# Patient Record
Sex: FEMALE | Race: OTHER | Employment: FULL TIME | URBAN - METROPOLITAN AREA
[De-identification: names, ages, dates, MRNs, and addresses within clinical notes are randomized per-mention and may not be internally consistent; named-entity substitution may affect disease eponyms.]

---

## 2019-02-15 ENCOUNTER — HOSPITAL ENCOUNTER (OUTPATIENT)
Dept: CT IMAGING | Age: 49
Discharge: HOME | End: 2019-02-15
Attending: NURSE PRACTITIONER
Payer: COMMERCIAL

## 2019-02-15 DIAGNOSIS — R10.9 LEFT FLANK PAIN: ICD-10-CM

## 2019-02-15 PROCEDURE — 74176 CT ABD & PELVIS W/O CONTRAST: CPT

## 2019-02-20 PROBLEM — Z85.43 HISTORY OF OVARIAN CANCER: Status: ACTIVE | Noted: 2019-02-20

## 2019-02-20 PROBLEM — J30.1 SEASONAL ALLERGIC RHINITIS DUE TO POLLEN: Status: ACTIVE | Noted: 2019-02-20

## 2019-02-20 PROBLEM — F41.9 ANXIETY: Status: ACTIVE | Noted: 2019-02-20

## 2019-02-20 PROBLEM — E78.2 MIXED HYPERLIPIDEMIA: Status: ACTIVE | Noted: 2019-02-20

## 2019-02-20 PROBLEM — G43.C1 INTRACTABLE PERIODIC HEADACHE SYNDROME: Status: ACTIVE | Noted: 2019-02-20

## 2019-02-20 PROBLEM — C56.1 MALIGNANT NEOPLASM OF RIGHT OVARY (HCC): Status: ACTIVE | Noted: 2019-02-20

## 2019-03-25 ENCOUNTER — HOSPITAL ENCOUNTER (OUTPATIENT)
Dept: MAMMOGRAPHY | Age: 49
Discharge: HOME | End: 2019-03-25
Attending: INTERNAL MEDICINE
Payer: COMMERCIAL

## 2019-03-25 DIAGNOSIS — Z12.31 VISIT FOR SCREENING MAMMOGRAM: ICD-10-CM

## 2019-03-25 PROCEDURE — 77067 SCR MAMMO BI INCL CAD: CPT

## 2019-04-03 NOTE — PROGRESS NOTES
LM on VM informing pt of Mammogram result, further imaging needed and she will be notified by Radiology to schedule this.

## 2019-04-03 NOTE — PROGRESS NOTES
Pt called back and I reiterated to her Mammogram result, further imaging needed and she will be notified by Radiology to schedule this.

## 2019-04-08 ENCOUNTER — HOSPITAL ENCOUNTER (OUTPATIENT)
Dept: SURGERY | Age: 49
Discharge: HOME | End: 2019-04-08

## 2019-04-10 VITALS — BODY MASS INDEX: 26.68 KG/M2 | WEIGHT: 166 LBS | HEIGHT: 66 IN

## 2019-04-10 NOTE — PERIOP NOTES
Patient verified name, , and procedure. Type: 1a; abbreviated assessment per anesthesia guidelines  Labs per surgeon: None. Labs per anesthesia: None. Instructed pt that they will be notified via office/GI LAB for time of arrival to GI lab. Follow diet and prep instructions per office including NPO status. If patient has NOT received instructions from office patient is advised to call surgeon office, verbalizes understanding. Bath or shower the night before and the am of surgery with non-mositurizing soap. No lotions, oils, powders, cologne on skin. No make up, eye make up or jewelry. Wear loose fitting comfortable, clean clothing. Must have adult present in building the entire time . Medications for the day of procedure none, patient to hold all vitamins/supplements/herbals starting now. The following discharge instructions reviewed with patient: medication given during procedure may cause drowsiness for several hours, therefore, do not drive or operate machinery for remainder of the day. You may not drink alcohol on the day of your procedure, please resume regular diet and activity unless otherwise directed. You may experience abdominal distention for several hours that is relieved by the passage of gas. Contact your physician if you have any of the following: fever or chills, severe abdominal pain or excessive amount of bleeding or a large amount when having a bowel movement.  Occasional specks of blood with bowel movement would not be unusual.

## 2019-04-11 ENCOUNTER — HOSPITAL ENCOUNTER (OUTPATIENT)
Dept: MAMMOGRAPHY | Age: 49
Discharge: HOME | End: 2019-04-11
Attending: INTERNAL MEDICINE
Payer: COMMERCIAL

## 2019-04-11 ENCOUNTER — HOSPITAL ENCOUNTER (OUTPATIENT)
Dept: LAB | Age: 49
Discharge: HOME | End: 2019-04-11
Payer: COMMERCIAL

## 2019-04-11 DIAGNOSIS — N63.20 MASS OF LEFT BREAST ON MAMMOGRAM: ICD-10-CM

## 2019-04-11 DIAGNOSIS — Z85.43 HISTORY OF OVARIAN CANCER: ICD-10-CM

## 2019-04-11 LAB
Lab: NORMAL
REFERENCE LAB,REFLB: NORMAL
TEST DESCRIPTION:,ATST: NORMAL

## 2019-04-11 PROCEDURE — 36415 COLL VENOUS BLD VENIPUNCTURE: CPT

## 2019-04-11 PROCEDURE — 77065 DX MAMMO INCL CAD UNI: CPT

## 2019-04-11 PROCEDURE — 76642 ULTRASOUND BREAST LIMITED: CPT

## 2019-04-14 ENCOUNTER — ANESTHESIA EVENT (OUTPATIENT)
Dept: ENDOSCOPY | Age: 49
End: 2019-04-14
Payer: COMMERCIAL

## 2019-04-15 ENCOUNTER — ANESTHESIA (OUTPATIENT)
Dept: ENDOSCOPY | Age: 49
End: 2019-04-15
Payer: COMMERCIAL

## 2019-04-15 ENCOUNTER — HOSPITAL ENCOUNTER (OUTPATIENT)
Age: 49
Setting detail: OUTPATIENT SURGERY
Discharge: HOME | End: 2019-04-15
Attending: SURGERY | Admitting: SURGERY
Payer: COMMERCIAL

## 2019-04-15 VITALS
DIASTOLIC BLOOD PRESSURE: 75 MMHG | RESPIRATION RATE: 14 BRPM | OXYGEN SATURATION: 98 % | SYSTOLIC BLOOD PRESSURE: 124 MMHG | TEMPERATURE: 98.6 F | HEART RATE: 67 BPM | WEIGHT: 165 LBS | BODY MASS INDEX: 26.63 KG/M2

## 2019-04-15 DIAGNOSIS — Z12.11 COLON CANCER SCREENING: ICD-10-CM

## 2019-04-15 LAB — HCG UR QL: NEGATIVE

## 2019-04-15 PROCEDURE — 81025 URINE PREGNANCY TEST: CPT

## 2019-04-15 PROCEDURE — 45378 DIAGNOSTIC COLONOSCOPY: CPT | Performed by: SURGERY

## 2019-04-15 PROCEDURE — 74011250636 HC RX REV CODE- 250/636: Performed by: ANESTHESIOLOGY

## 2019-04-15 PROCEDURE — 74011250636 HC RX REV CODE- 250/636

## 2019-04-15 PROCEDURE — 76040000025: Performed by: SURGERY

## 2019-04-15 PROCEDURE — 76060000032 HC ANESTHESIA 0.5 TO 1 HR: Performed by: SURGERY

## 2019-04-15 RX ORDER — SODIUM CHLORIDE, SODIUM LACTATE, POTASSIUM CHLORIDE, CALCIUM CHLORIDE 600; 310; 30; 20 MG/100ML; MG/100ML; MG/100ML; MG/100ML
100 INJECTION, SOLUTION INTRAVENOUS CONTINUOUS
Status: DISCONTINUED | OUTPATIENT
Start: 2019-04-15 | End: 2019-04-15 | Stop reason: HOSPADM

## 2019-04-15 RX ORDER — SODIUM CHLORIDE 0.9 % (FLUSH) 0.9 %
5-40 SYRINGE (ML) INJECTION EVERY 8 HOURS
Status: DISCONTINUED | OUTPATIENT
Start: 2019-04-15 | End: 2019-04-15 | Stop reason: HOSPADM

## 2019-04-15 RX ORDER — SODIUM CHLORIDE 0.9 % (FLUSH) 0.9 %
5-40 SYRINGE (ML) INJECTION AS NEEDED
Status: DISCONTINUED | OUTPATIENT
Start: 2019-04-15 | End: 2019-04-15 | Stop reason: HOSPADM

## 2019-04-15 RX ORDER — PROPOFOL 10 MG/ML
INJECTION, EMULSION INTRAVENOUS AS NEEDED
Status: DISCONTINUED | OUTPATIENT
Start: 2019-04-15 | End: 2019-04-15 | Stop reason: HOSPADM

## 2019-04-15 RX ORDER — PROPOFOL 10 MG/ML
INJECTION, EMULSION INTRAVENOUS
Status: DISCONTINUED | OUTPATIENT
Start: 2019-04-15 | End: 2019-04-15 | Stop reason: HOSPADM

## 2019-04-15 RX ADMIN — SODIUM CHLORIDE, SODIUM LACTATE, POTASSIUM CHLORIDE, AND CALCIUM CHLORIDE 100 ML/HR: 600; 310; 30; 20 INJECTION, SOLUTION INTRAVENOUS at 08:16

## 2019-04-15 RX ADMIN — PROPOFOL 120 MCG/KG/MIN: 10 INJECTION, EMULSION INTRAVENOUS at 09:20

## 2019-04-15 RX ADMIN — PROPOFOL 50 MG: 10 INJECTION, EMULSION INTRAVENOUS at 09:30

## 2019-04-15 RX ADMIN — PROPOFOL 150 MG: 10 INJECTION, EMULSION INTRAVENOUS at 09:20

## 2019-04-15 NOTE — OP NOTES
Colonoscopy Procedure Note    Date of procedure: 4/15/2019      Name: Loyd Thompson      MRN: [id number]       : 1970       Age: 46 y.o. Sex: female    Preoperative Diagnosis: 1. CRC screening    Postoperative Diagnosis: 1. Same      2. Internal hemorrhoids    Procedure in Detail:  Informed consent was obtained for the procedure. The patient was placed in the left lateral decubitus position and sedation was induced by anesthesia. The [id number] was inserted into the rectum and advanced under direct vision to the cecum, which was identified by the ileocecal valve and appendiceal orifice. The quality of the colonic preparation was satisfactory. A careful inspection was made as the colonoscope was withdrawn, including a retroflexed view of the rectum; findings and interventions are described below. Appropriate photodocumentation was obtained. Findings:   Rectum:     - Protruding lesions:     -Internal Hemorrhoids  Sigmoid:   Normal  Descending Colon:   Normal  Transverse Colon:   Normal  Ascending Colon:   Normal  Cecum:   Normal          Specimens: No specimens were collected. Complications: None; patient tolerated the procedure well. EBL - minimal    Recommendations:   - For colon cancer screening in this average-risk patient, colonoscopy may be repeated in 10 years.      Signed By: Lon Chen MD  [facility] - Bariatric & Minimally Invasive Surgery  4/15/2019 9:45 AM

## 2019-04-15 NOTE — DISCHARGE INSTRUCTIONS
Gastrointestinal Colonoscopy/Flexible Sigmoidoscopy - Lower Exam Discharge Instructions  1. Call Dr. Mirela Ferrer at office for any problems or questions. 2. Contact the doctors office for follow up appointment as directed  3. Medication may cause drowsiness for several hours, therefore:  · Do not drive or operate machinery for reminder of the day. · No alcohol today. · Do not make any important or legal decisions for 24 hours. · Do not sign any legal documents for 24 hours. 4. Ordinarily, you may resume regular diet and activity after exam unless otherwise specified by your physician. 5. Because of air put into your colon during exam, you may experience some abdominal distension, relieved by the passage of gas, for several hours. 6. Contact your physician if you have any of the following:  · Excessive amount of bleeding - large amount when having a bowel movement. Occasional specks of blood with bowel movement would not be unusual.  · Severe abdominal pain  · Fever or Chills  7. Polyp Removal - follow these additional instructions  · Clear liquid diet for the next meal (jell-o, broth, clear drinks)  · Soft diet for 24 hours, then resume regular diet   · Take Metamucil - 1 tablespoon in juice every morning for 3 days  · No Aspirin, Advil, Aleve, Nuprin, Ibuprofen, or medications that contain these drugs for 2 weeks. Any additional instructions:  ***      Instructions given to Jennifer Lawson and other family members.   Instructions given by:  Verito Jimenez RN

## 2019-04-15 NOTE — ANESTHESIA PREPROCEDURE EVALUATION
Relevant Problems No relevant active problems Anesthetic History No history of anesthetic complications Review of Systems / Medical History Patient summary reviewed and pertinent labs reviewed Pulmonary Within defined limits Neuro/Psych Within defined limits Cardiovascular Within defined limits Exercise tolerance: >4 METS 
  
GI/Hepatic/Renal 
Within defined limits Endo/Other Within defined limits Other Findings Physical Exam 
 
Airway Mallampati: II 
TM Distance: 4 - 6 cm Neck ROM: normal range of motion Mouth opening: Normal 
 
 Cardiovascular Rhythm: regular Rate: normal 
 
 
 
 Dental 
No notable dental hx Pulmonary Breath sounds clear to auscultation Abdominal 
 
 
 
 Other Findings Anesthetic Plan ASA: 1 Anesthesia type: total IV anesthesia Anesthetic plan and risks discussed with: Patient and Spouse

## 2019-04-15 NOTE — ANESTHESIA POSTPROCEDURE EVALUATION
Procedure(s): 
COLONOSCOPY/ 28. 
 
total IV anesthesia Anesthesia Post Evaluation Multimodal analgesia: multimodal analgesia used between 6 hours prior to anesthesia start to PACU discharge Patient location during evaluation: PACU Patient participation: complete - patient participated Level of consciousness: awake Pain management: adequate Airway patency: patent Anesthetic complications: no 
Cardiovascular status: acceptable and hemodynamically stable Respiratory status: acceptable Hydration status: acceptable Comments: Acceptable for discharge from PACU. Post anesthesia nausea and vomiting:  none Vitals Value Taken Time /74 4/15/2019  9:55 AM  
Temp 37 °C (98.6 °F) 4/15/2019  9:50 AM  
Pulse 77 4/15/2019  9:55 AM  
Resp 14 4/15/2019  9:55 AM  
SpO2 99 % 4/15/2019  9:55 AM

## 2019-04-15 NOTE — H&P
Mariluz Colón MD   Bariatric & Advanced Laparoscopic Surgery & Endoscopy  [address] [place], [place] [address]  Phone [phone]   Fax [phone]      Date of visit: 4/15/2019      Primary/Requesting provider: Diana Wayne MD         Name: Jennifer Lawson      MRN: [id number]       : 1970       Age: 46 y.o. Sex: female        PCP: Diana Wayne MD     CC:  No chief complaint on file. HPI:     Jennifer Lawson is a 46 y.o. female here for a colonoscopy. PMH:    Past Medical History:   Diagnosis Date    Anxiety     Burning with urination     Headache     History of mammogram 2018    History of Papanicolaou smear of cervix     Hx of seasonal allergies     Ovarian cancer ([address]     R oophorectomy       PSH:    Past Surgical History:   Procedure Laterality Date    HX APPENDECTOMY      HX BREAST BIOPSY      HX OOPHORECTOMY Right     HX OVARIAN CYST REMOVAL Left ,        MEDS:    Current Facility-Administered Medications   Medication    lactated Ringers infusion       ALLERGIES:      Allergies   Allergen Reactions    Latex, Natural Rubber Anaphylaxis    Penicillins Swelling     Was given it in  during surgery and told by the medical staff that she is allergic. She was unsure of the reaction she had to the drug.  Papaya Rash       SH:    Social History     Tobacco Use    Smoking status: Never Smoker    Smokeless tobacco: Never Used   Substance Use Topics    Alcohol use: Yes     Comment: Social    Drug use: No       FH:    Family History   Problem Relation Age of Onset   Titus Arthritis-osteo Mother     Diabetes Father     Heart Disease Father     Stroke Paternal Uncle     Diabetes Paternal Uncle     Stomach Cancer Paternal Uncle     Breast Cancer Neg Hx        Review of systems:  Review of Systems   Constitutional: Negative.     Respiratory: Negative for hemoptysis and shortness of breath. Cardiovascular: Negative for chest pain, palpitations and leg swelling. Gastrointestinal: Negative for abdominal pain, nausea and vomiting. Physical Exam:     Visit Vitals  /74   Pulse 72   Temp 97.7 °F (36.5 °C)   Resp 14   Wt 165 lb (74.8 kg)   SpO2 98%   BMI 26.63 kg/m²       General:  Well-developed, well-nourished, no distress. Psych:  Cooperative, good insight and judgement. Neuro:  Alert, oriented to person, place and time. HEENT:  Normocephalic, atraumatic. Sclera clear. Lungs:  Unlabored breathing. Symmetrical chest expansion. Chest wall:  No tenderness or deformity. Heart:  Regular rate and rhythm. No JVD. Abdomen:  Soft, non-tender, non-distended. No palpable masses. Extremities:  Extremities normal, atraumatic, no cyanosis or edema. Skin:  Skin color, texture, turgor normal. No rashes. Assessment:  Nicolas Jolly is a 46 y.o. female was referred here for a colonoscopy. Recommendations:     1. Proceed with colonoscopy. The risks, benefits, alternatives, and consequences were reviewed with the patient, who expressed verbal understanding and agreement to proceed.        Signed: Heidi Alicea MD  Bariatric & Minimally Invasive Surgery  [facility]  4/15/2019 8:09 AM

## 2019-06-25 ENCOUNTER — HOSPITAL ENCOUNTER (OUTPATIENT)
Dept: GENERAL RADIOLOGY | Age: 49
Discharge: HOME | End: 2019-06-25
Attending: NURSE PRACTITIONER
Payer: COMMERCIAL

## 2019-06-25 DIAGNOSIS — M75.41 IMPINGEMENT SYNDROME OF RIGHT SHOULDER: ICD-10-CM

## 2019-06-25 PROCEDURE — 73030 X-RAY EXAM OF SHOULDER: CPT

## 2019-06-25 NOTE — PROGRESS NOTES
Please let Clari Wagoner know that XR is normal. She needs to try PT for 4-6 weeks and follow up in office for evaluation. Should she continue to have symptoms we can then try further imaging. May continue tylenol/advil, heat therapy prn pain.

## 2019-06-25 NOTE — PROGRESS NOTES
This has been fully explained to the patient, who indicates understanding that per Keke NP noted XR is normal. She needs to try PT for 4-6 weeks and follow up in office for evaluation. Should she continue to have symptoms we can then try further imaging. May continue tylenol/advil, heat therapy prn pain.

## 2019-06-28 ENCOUNTER — HOSPITAL ENCOUNTER (OUTPATIENT)
Dept: PHYSICAL THERAPY | Age: 49
Discharge: HOME | End: 2019-06-28
Attending: NURSE PRACTITIONER
Payer: COMMERCIAL

## 2019-06-28 DIAGNOSIS — S46.011A STRAIN OF MUSC/TEND THE ROTATOR CUFF OF RIGHT SHOULDER, INIT: ICD-10-CM

## 2019-06-28 PROCEDURE — 97110 THERAPEUTIC EXERCISES: CPT

## 2019-06-28 PROCEDURE — 97162 PT EVAL MOD COMPLEX 30 MIN: CPT

## 2019-06-28 NOTE — PROGRESS NOTES
Linette Cortez  : 1970  Primary: Xavier Dela Cruz Rpn  Secondary:  [address] at [place] [address] [facility], [address] [place] [address],[address], [place] [address].  [phone]   [phone]        OUTPATIENT PHYSICAL THERAPY: Daily Treatment Note 2019  Visit Count:  1    ICD-10: Treatment Diagnosis:   · Pain in right shoulder (M25.511)  · Stiffness of right shoulder, not elsewhere classified (M25.611)  Precautions/Allergies:   Latex, natural rubber; Penicillins; and Papaya   TREATMENT PLAN:  Effective Dates: 2019 TO 2019 (90 days). Frequency/Duration: 2 times a week for 90 Day(s)    Pre-treatment Symptoms/Complaints:  2019: Patient reports she is hurting in her shoulder today. Pain: Initial: Pain Intensity 1: 4  Pain Location 1: Shoulder  Pain Orientation 1: Right  Pain Intervention(s) 1: Rest, Medication (see MAR)  Post Session:  4/10   Medications Last Reviewed:  2019  Updated Objective Findings:  See evaluation note from today  TREATMENT:     THERAPEUTIC EXERCISE: (15 minutes):  Exercises per grid below to improve mobility and strength. Required minimal visual, verbal and manual cues to promote proper body alignment, promote proper body posture and promote proper body mechanics. Progressed resistance, range, repetitions and complexity of movement as indicated. Date:  2019   Activity/Exercise Parameters   Pulleys  Flexion/scaption to tolerance  HEP   Shoulder circles in standing 10 reps  HEP      MODALITIES: (10 minutes):      *  Cold Pack Therapy in order to provide analgesia and relieve muscle spasm. Treatment/Session Summary:    · Response to Treatment:  Patient tolerated assessment with minimal complaints of increased right shoulder pain. Patient verbalized and demonstrated understanding of HEP.   · Communication/Consultation:  None today  · Equipment provided today:  None today  · Recommendations/Intent for next treatment session: Next visit will focus on improving overall mobility and pain.     Total Treatment Billable Duration:  15 minutes + evaluation + cold pack  PT Patient Time In/Time Out  Time In: 0900  Time Out: 1221 Alex Cabrera PT    Future Appointments   Date Time Provider Yvon Tyler   6/28/2019  9:00 AM Nathan Villaseñor PT [facility]

## 2019-07-02 ENCOUNTER — HOSPITAL ENCOUNTER (OUTPATIENT)
Dept: PHYSICAL THERAPY | Age: 49
Discharge: HOME | End: 2019-07-02
Attending: NURSE PRACTITIONER
Payer: COMMERCIAL

## 2019-07-02 PROCEDURE — 97035 APP MDLTY 1+ULTRASOUND EA 15: CPT

## 2019-07-02 PROCEDURE — 97140 MANUAL THERAPY 1/> REGIONS: CPT

## 2019-07-02 PROCEDURE — 97110 THERAPEUTIC EXERCISES: CPT

## 2019-07-02 NOTE — PROGRESS NOTES
Anais Ben  : 1970  Primary: Satinder Dela Cruz Rpn  Secondary:  [address] at [place] [address] [facility], [address] [place] [address],[address], [place] [address].  [phone]   [phone]        OUTPATIENT PHYSICAL THERAPY: Daily Treatment Note 2019  Visit Count:  2    ICD-10: Treatment Diagnosis:   · Pain in right shoulder (M25.511)  · Stiffness of right shoulder, not elsewhere classified (M25.611)  Precautions/Allergies:   Latex, natural rubber; Penicillins; and Papaya   TREATMENT PLAN:  Effective Dates: 2019 TO 2019 (90 days). Frequency/Duration: 2 times a week for 90 Day(s)    Pre-treatment Symptoms/Complaints:  2019: Patient reports she is hurting in her shoulder today. Pain: Initial:   7 Post Session:  5/10   Medications Last Reviewed:  2019  Updated Objective Findings:  None Today  TREATMENT:     THERAPEUTIC EXERCISE: (20 minutes):  Exercises per grid below to improve mobility and strength. Required minimal visual, verbal and manual cues to promote proper body alignment, promote proper body posture and promote proper body mechanics. Progressed resistance, range, repetitions and complexity of movement as indicated. MANUAL THERAPY: (20 minutes): Joint mobilization was utilized and necessary because of the patient's restricted joint motion, loss of articular motion and restricted motion of soft tissue. Date:  19   Activity/Exercise Parameters   UBE 5 min Level 1.5   Tband IR ER Retration Extension Red x 10 reps   Wall Ladder climb R x 5 reps           Pulleys  Flexion/scaption to tolerance  HEP   Shoulder circles in standing 10 reps  HEP      MODALITIES: (8 minutes): *  Ultrasound was used today secondary to the patient having tightened structures limiting joint motion that require an increase in extensibility and pain.  Ultrasound was used today to reduce pain, reduce joint stiffness, increase muscle flexibility, increase tendon flexibility and increase ligament flexibility. Treatment/Session Summary:    · Response to Treatment:  Patient tolerated assessment with minimal complaints of increased right shoulder pain. Patient verbalized and demonstrated understanding of HEP. · Communication/Consultation:  None today  · Equipment provided today:  None today  · Recommendations/Intent for next treatment session: Next visit will focus on improving overall mobility and pain.     Total Treatment Billable Duration:  48  PT Patient Time In/Time Out  Time In: 1100  Time Out: [address] [place], [place]    Future Appointments   Date Time Provider Yvon Moseleyi   7/8/2019  1:45 PM Estevan Montes PTA [facility]   7/12/2019 10:15 AM Dayne Jamison, PT [facility]

## 2019-07-08 ENCOUNTER — APPOINTMENT (OUTPATIENT)
Dept: PHYSICAL THERAPY | Age: 49
End: 2019-07-08
Attending: NURSE PRACTITIONER
Payer: COMMERCIAL

## 2019-07-09 ENCOUNTER — HOSPITAL ENCOUNTER (OUTPATIENT)
Dept: PHYSICAL THERAPY | Age: 49
Discharge: HOME | End: 2019-07-09
Attending: NURSE PRACTITIONER
Payer: COMMERCIAL

## 2019-07-09 PROCEDURE — 97140 MANUAL THERAPY 1/> REGIONS: CPT

## 2019-07-09 PROCEDURE — 97110 THERAPEUTIC EXERCISES: CPT

## 2019-07-09 NOTE — PROGRESS NOTES
Brijesh Ramos  : 1970  Primary: Su Dela Cruz Rpn  Secondary:  [address] at [place] [address] [facility], [address] [place] [address],[address], [facility] [address].  [phone]   [phone]        OUTPATIENT PHYSICAL THERAPY: Daily Treatment Note 2019  Visit Count:  3    ICD-10: Treatment Diagnosis:   · Pain in right shoulder (M25.511)  · Stiffness of right shoulder, not elsewhere classified (M25.611)  Precautions/Allergies:   Latex, natural rubber; Penicillins; and Papaya   TREATMENT PLAN:  Effective Dates: 2019 TO 2019 (90 days). Frequency/Duration: 2 times a week for 90 Day(s)    Pre-treatment Symptoms/Complaints:  2019: Patient reports she is hurting today. Pain: Initial: Pain Intensity 1: 4  Pain Location 1: Shoulder  Pain Orientation 1: Right  Pain Intervention(s) 1: Rest, Medication (see MAR)  Post Session:  4/10   Medications Last Reviewed:  2019  Updated Objective Findings:  None Today  TREATMENT:     THERAPEUTIC EXERCISE: (20 minutes):  Exercises per grid below to improve mobility and strength. Required minimal visual, verbal and manual cues to promote proper body alignment, promote proper body posture and promote proper body mechanics. Progressed resistance, range, repetitions and complexity of movement as indicated. Date:  2019   Activity/Exercise Parameters   UBE 5 minutes  Level 1.5   Theraband Internal Rotation  Red t-band  10 reps   Theraband External rotation Red t-band  10 reps   Theraband Rows Red t-band  10 reps   Theraband pulls Red t-band  10 reps   Wall Ladder climb R UE  5 reps   Pulleys  Flexion/scaption to tolerance  HEP   Shoulder circles in standing 10 reps  HEP     MANUAL THERAPY: (15 minutes): Joint and soft tissue mobilization to right upper trap, scapular, and deltoid region was utilized and necessary because of the patient's restricted joint motion, painful spasm, loss of articular motion and restricted motion of soft tissue. MODALITIES: (10 minutes):      *  Cold Pack Therapy in order to provide analgesia, relieve muscle spasm and reduce inflammation and edema. Treatment/Session Summary:    · Response to Treatment:  Patient completed all activities with pain during all activities. · Communication/Consultation:  None today  · Equipment provided today:  None today  · Recommendations/Intent for next treatment session: Next visit will focus on improving overall mobility and pain.     Total Treatment Billable Duration:  35 minutes  PT Patient Time In/Time Out  Time In: 1100  Time Out: [address], PT    Future Appointments   Date Time Provider Yvon Tyler   7/9/2019 11:00 AM Jodie Morris, PT [facility]Z   7/12/2019 10:15 AM [facility]vis Zepeda, [facility]

## 2019-07-12 ENCOUNTER — HOSPITAL ENCOUNTER (OUTPATIENT)
Dept: PHYSICAL THERAPY | Age: 49
Discharge: HOME | End: 2019-07-12
Attending: NURSE PRACTITIONER
Payer: COMMERCIAL

## 2019-07-12 PROCEDURE — 97140 MANUAL THERAPY 1/> REGIONS: CPT

## 2019-07-12 PROCEDURE — 97110 THERAPEUTIC EXERCISES: CPT

## 2019-07-12 NOTE — PROGRESS NOTES
Anais Ben  : 1970  Primary: Satinder Dela Cruz Rpn  Secondary:  [address] at [place] [address] [facility], [address] [place] [address],[address], [place] [address].  [phone]   [phone]        OUTPATIENT PHYSICAL THERAPY: Daily Treatment Note 2019  Visit Count:  4    ICD-10: Treatment Diagnosis:   · Pain in right shoulder (M25.511)  · Stiffness of right shoulder, not elsewhere classified (M25.611)  Precautions/Allergies:   Latex, natural rubber; Penicillins; and Papaya   TREATMENT PLAN:  Effective Dates: 2019 TO 2019 (90 days). Frequency/Duration: 2 times a week for 90 Day(s)    Pre-treatment Symptoms/Complaints:  2019: Patient reports she felt good for two days after her last session and even slept well that night, but she reached around in her car to get something with her right arm and so now she is hurting. Pain: Initial: Pain Intensity 1: 4  Pain Location 1: Shoulder  Pain Orientation 1: Right  Pain Intervention(s) 1: Rest, Medication (see MAR)  Post Session:  4/10   Medications Last Reviewed:  2019  Updated Objective Findings:  None Today  TREATMENT:     THERAPEUTIC EXERCISE: (20 minutes):  Exercises per grid below to improve mobility and strength. Required minimal visual, verbal and manual cues to promote proper body alignment, promote proper body posture and promote proper body mechanics. Progressed resistance, range, repetitions and complexity of movement as indicated.    Date:  2019   Activity/Exercise Parameters   UBE 5 minutes  Level 1.5   Theraband Internal Rotation  Red t-band  10 reps   Theraband External rotation Red t-band  10 reps   Theraband Rows Red t-band  10 reps   Theraband pulls Red t-band  10 reps   Wall Ladder climb R UE  5 reps   Pulleys  Flexion/scaption to tolerance  HEP   Shoulder circles in standing 10 reps  HEP     MANUAL THERAPY: (15 minutes): Joint and soft tissue mobilization to right upper trap, scapular, and deltoid region was utilized and necessary because of the patient's restricted joint motion, painful spasm, loss of articular motion and restricted motion of soft tissue. MODALITIES: (10 minutes):      *  Cold Pack Therapy in order to provide analgesia, relieve muscle spasm and reduce inflammation and edema. Treatment/Session Summary:    · Response to Treatment:  Patient completed all activities with minimal decrease in tightness compared to last visit. Improved with manual therapy to scapula region. · Communication/Consultation:  None today  · Equipment provided today:  None today  · Recommendations/Intent for next treatment session: Next visit will focus on improving overall mobility and pain.     Total Treatment Billable Duration:  35 minutes  PT Patient Time In/Time Out  Time In: 1015  Time Out: [address], PT    Future Appointments   Date Time Provider Yvon Tyler   7/12/2019 10:15 AM ROBYN Cordero

## 2019-07-16 ENCOUNTER — HOSPITAL ENCOUNTER (OUTPATIENT)
Dept: PHYSICAL THERAPY | Age: 49
Discharge: HOME | End: 2019-07-16
Attending: NURSE PRACTITIONER
Payer: COMMERCIAL

## 2019-07-16 PROCEDURE — 97140 MANUAL THERAPY 1/> REGIONS: CPT

## 2019-07-16 PROCEDURE — 97110 THERAPEUTIC EXERCISES: CPT

## 2019-07-16 NOTE — PROGRESS NOTES
Davonte Alvarez  : 1970  Primary: Tello Dela Cruz Rpn  Secondary:  [address] at [place] [id number] [facility], [address] [place] [address],[address], [facility] [address].  [phone]   [phone]        OUTPATIENT PHYSICAL THERAPY: Daily Treatment Note 2019  Visit Count:  5    ICD-10: Treatment Diagnosis:   · Pain in right shoulder (M25.511)  · Stiffness of right shoulder, not elsewhere classified (M25.611)  Precautions/Allergies:   Latex, natural rubber; Penicillins; and Papaya   TREATMENT PLAN:  Effective Dates: 2019 TO 2019 (90 days). Frequency/Duration: 2 times a week for 90 Day(s)    Pre-treatment Symptoms/Complaints:  2019:  \"I am hurting more today\"   Pain: Initial:    6-10 Post Session:  4/10   Medications Last Reviewed:  2019  Updated Objective Findings:  None Today  TREATMENT:     THERAPEUTIC EXERCISE: (20 minutes):  Exercises per grid below to improve mobility and strength. Required minimal visual, verbal and manual cues to promote proper body alignment, promote proper body posture and promote proper body mechanics. Progressed resistance, range, repetitions and complexity of movement as indicated. Date:  2019   Activity/Exercise Parameters   UBE 5 minutes  Level 1.5   Theraband Internal Rotation  Red t-band  10 reps   Theraband External rotation Red t-band  10 reps   Theraband Rows Red t-band  10 reps   Theraband pulls Red t-band  10 reps   Wall Ladder climb R UE  5 reps   Pulleys  Flexion/scaption to tolerance  HEP   Shoulder circles in standing 10 reps  HEP     MANUAL THERAPY: (25 minutes): soft tissue through upper trap and rom in all planes of motion-increased tightness today. was utilized and necessary because of the patient's restricted joint motion, painful spasm, loss of articular motion and restricted motion of soft tissue.        MODALITIES: (icing at home today minutes):      *  Cold Pack Therapy in order to provide analgesia, relieve muscle spasm and reduce inflammation and edema. Treatment/Session Summary:    · Response to Treatment:   Patient has increased ROM in shoulder today with pain. Patient going to get referred to Ortho MD for further evaluation on shoulder. Continue plan of care. .  · Communication/Consultation:  None today  · Equipment provided today:  None today  · Recommendations/Intent for next treatment session: Next visit will focus on improving overall mobility and pain.     Total Treatment Billable Duration:  45 minutes  PT Patient Time In/Time Out  Time In: 1262  Time Out: [address], PTA    Future Appointments   Date Time Provider Yvon Tyler   7/19/2019  4:45 PM Coretta Zayas, PT [facility]   7/25/2019  4:00 PM Talia Jamison, [facility]

## 2019-07-19 ENCOUNTER — HOSPITAL ENCOUNTER (OUTPATIENT)
Dept: PHYSICAL THERAPY | Age: 49
Discharge: HOME | End: 2019-07-19
Attending: NURSE PRACTITIONER
Payer: COMMERCIAL

## 2019-07-19 PROCEDURE — 97110 THERAPEUTIC EXERCISES: CPT

## 2019-07-19 PROCEDURE — 97140 MANUAL THERAPY 1/> REGIONS: CPT

## 2019-07-19 NOTE — PROGRESS NOTES
Michael Hernandez  : 1970  Primary: Roxanne Dela Cruz Rpn  Secondary:  [address]  at [place] [address] [facility], [address] [place] [address],[address], [address] [facility]  [phone]   [phone]        OUTPATIENT PHYSICAL THERAPY: Daily Treatment Note 2019  Visit Count:  6    ICD-10: Treatment Diagnosis:   · Pain in right shoulder (M25.511)  · Stiffness of right shoulder, not elsewhere classified (M25.611)  Precautions/Allergies:   Latex, natural rubber; Penicillins; and Papaya   TREATMENT PLAN:  Effective Dates: 2019 TO 2019 (90 days). Frequency/Duration: 2 times a week for 90 Day(s)    Pre-treatment Symptoms/Complaints:  2019:  Patient reports she is still hurting, but better than she was initially. She reports she went back to her referring MD who is going to send her to the orthopedic for an evaluation. Pain: Initial: Pain Intensity 1: 4  Pain Location 1: Shoulder  Pain Orientation 1: Right  Pain Intervention(s) 1: Rest, Medication (see MAR)   Post Session:  4/10   Medications Last Reviewed:  2019  Updated Objective Findings:  None Today  TREATMENT:     THERAPEUTIC EXERCISE: (20 minutes):  Exercises per grid below to improve mobility and strength. Required minimal visual, verbal and manual cues to promote proper body alignment, promote proper body posture and promote proper body mechanics. Progressed resistance, range, repetitions and complexity of movement as indicated. Date:  2019   Activity/Exercise Parameters   UBE 5 minutes  Level 1.5   Theraband Internal Rotation  Red t-band  10 reps   Theraband External rotation Red t-band  10 reps   Theraband Rows Red t-band  10 reps   Theraband pulls Red t-band  10 reps   Wall Ladder climb R UE  5 reps   Pulleys  Flexion/scaption to tolerance  HEP   Shoulder circles in standing 10 reps  HEP     MANUAL THERAPY: (25 minutes): soft tissue through upper trap and rom in all planes of motion-increased tightness today.  was utilized and necessary because of the patient's restricted joint motion, painful spasm, loss of articular motion and restricted motion of soft tissue. Treatment/Session Summary:    · Response to Treatment:   Patient completed all activities with minimal improvements in mobility. Ice at home. · Communication/Consultation:  None today  · Equipment provided today:  None today  · Recommendations/Intent for next treatment session: Next visit will focus on improving overall mobility and pain.     Total Treatment Billable Duration:  45 minutes  PT Patient Time In/Time Out  Time In: 9143  Time Out: Marilee 38, PT    Future Appointments   Date Time Provider Yvon Tyler   7/19/2019  4:45 PM Jhonnie Sever, PT [facility]   7/25/2019  4:00 PM Johanna Jamison, PT [facility]

## 2019-07-25 ENCOUNTER — HOSPITAL ENCOUNTER (OUTPATIENT)
Dept: PHYSICAL THERAPY | Age: 49
Discharge: HOME | End: 2019-07-25
Attending: NURSE PRACTITIONER
Payer: COMMERCIAL

## 2019-07-25 NOTE — PROGRESS NOTES
Therapy Center at [address], [address]  Phone: [phone]   Fax: [phone]    OUTPATIENT DAILY NOTE    NAME/AGE/GENDER: Alayna Reyna is a 46 y.o. female. DATE: 7/25/2019    Patient cancelled (more than 24 hours ago) her appointment for today due to conflicting appointment. Will plan to follow up on next scheduled visit.     Polo Galvin, PT

## 2019-08-09 RX ORDER — SODIUM CHLORIDE 0.9 % (FLUSH) 0.9 %
5-40 SYRINGE (ML) INJECTION AS NEEDED
Status: CANCELLED | OUTPATIENT
Start: 2019-08-09

## 2019-08-09 RX ORDER — CEFAZOLIN SODIUM/WATER 2 G/20 ML
2 SYRINGE (ML) INTRAVENOUS ONCE
Status: CANCELLED | OUTPATIENT
Start: 2019-08-09 | End: 2019-08-09

## 2019-08-09 RX ORDER — SODIUM CHLORIDE 0.9 % (FLUSH) 0.9 %
5-40 SYRINGE (ML) INJECTION EVERY 8 HOURS
Status: CANCELLED | OUTPATIENT
Start: 2019-08-09

## 2019-08-16 ENCOUNTER — HOSPITAL ENCOUNTER (OUTPATIENT)
Dept: SURGERY | Age: 49
Discharge: HOME | End: 2019-08-16

## 2019-08-16 VITALS — HEIGHT: 66 IN | WEIGHT: 165 LBS | BODY MASS INDEX: 26.52 KG/M2

## 2019-08-17 PROBLEM — M19.011 DEGENERATIVE JOINT DISEASE OF RIGHT ACROMIOCLAVICULAR JOINT: Status: ACTIVE | Noted: 2019-08-17

## 2019-08-17 PROBLEM — S46.111A STRAIN OF MUSCLE, FASCIA AND TENDON OF LONG HEAD OF BICEPS, RIGHT ARM, INITIAL ENCOUNTER: Status: ACTIVE | Noted: 2019-08-17

## 2019-08-17 PROBLEM — S46.011A TRAUMATIC TEAR OF RIGHT ROTATOR CUFF: Status: ACTIVE | Noted: 2019-08-17

## 2019-08-17 PROBLEM — M75.01 ADHESIVE CAPSULITIS OF RIGHT SHOULDER: Status: ACTIVE | Noted: 2019-08-17

## 2019-08-17 PROBLEM — S43.431A SUPERIOR GLENOID LABRUM LESION OF RIGHT SHOULDER: Status: ACTIVE | Noted: 2019-08-17

## 2019-08-17 NOTE — BRIEF OP NOTE
BRIEF OPERATIVE NOTE    Date of Procedure: 8/23/2019     Preoperative Diagnosis:  ROTATOR CUFF SPRAIN RIGHT SHOULDER      BICEPITAL STRAIN RIGHT SHOULDER      SLAP TEAR RIGHT SHOULDER      ADHESIVE CAPSULITIS RIGHT SHOULDER                           AC OA RIGHT SHOULDER    Postoperative Diagnosis:  SAME      GLENOHUMERAL CHONDROMALACIA RIGHT SHOULDER    Procedure(s): EXAMINATION AND MANIPULATION RIGHT SHOULDER ARTHROSCOPY RIGHT SHOULDER ARTHROSCOPIC SUBACROMIAL DECOMPRESSION, DISTAL CLAVICLE RESECTION, LYSIS OF ADHESIONS, MINI OPEN ROTATOR CUFF REPAIR, BICEPS TENODESIS    Surgeon(s) and Role:     * Lauren Church MD - Primary         Assistant Staff:  Steve Andino CFA      Surgical Staff:  Circ-1: (Unknown)  Scrub Tech-1: (Unknown)  Scrub Tech-2: (Unknown)  Scrub Tech-3: (Unknown)  No case tracking events are documented in the log. Anesthesia:  GENERAL WITH INTERSCALENE BLOCK    Estimated Blood Loss: 10 CC. Complications: NONE    Implants:   Implant Name Type Inv.  Item Serial No.  Lot No. LRB No. Used Action   ANCHOR SUT 5.5MM W/NDL PEEK ZP --  - [id number]  ANCHOR SUT 5.5MM W/NDL PEEK ZP --  [id number] [employer] [id number] Right 2 Implanted   ANCHOR SUT 2.3MM W/2.0MM BRAID -- ICONIX - [id number]  ANCHOR SUT 2.3MM W/2.0MM BRAID -- [employer]IX [id number] [employer] [id number] Right 1 Implanted       Jacky Iqbal MD

## 2019-08-17 NOTE — H&P
[facility] HISTORY AND PHYSICAL    Subjective:     Patient is a 44 y.o. RHD FEMALE WITH RIGHT SHOULDER PAIN. SEE OFFICE NOTE. Patient Active Problem List    Diagnosis Date Noted    Traumatic tear of right rotator cuff 08/17/2019    Strain of muscle, fascia and tendon of long head of biceps, right arm, initial encounter 08/17/2019    Superior glenoid labrum lesion of right shoulder 08/17/2019    Adhesive capsulitis of right shoulder 08/17/2019    Degenerative joint disease of right acromioclavicular joint 08/17/2019    Seasonal allergic rhinitis due to pollen 02/20/2019    Anxiety 02/20/2019    History of ovarian cancer 02/20/2019    Intractable periodic headache syndrome 02/20/2019    Mixed hyperlipidemia 02/20/2019     Past Medical History:   Diagnosis Date    Anxiety     Burning with urination     history-resolved     Headache     History of mammogram 03/2018    History of Papanicolaou smear of cervix 2017    Hx of seasonal allergies     Ovarian cancer ([facility] [address] 1990    R oophorectomy-Per Pt Stage I and had chemotherapy for 12 months      Past Surgical History:   Procedure Laterality Date    COLONOSCOPY N/A 4/15/2019    COLONOSCOPY/ 28 performed by Sana Cedillo MD at [address] HX BREAST BIOPSY Left     Benign per pt    HX OOPHORECTOMY Right 1990    HX OVARIAN CYST REMOVAL Left 1993, 1999      Prior to Admission medications    Medication Sig Start Date End Date Taking? Authorizing Provider   estradiol [facility] [place] [facility]) 10 mcg inst Insert 1 Insert into vagina two (2) times a week. 4/26/19   Juan Daniel Celestin MD   magnesium 200 mg tab Take  by mouth. Provider, Historical   Biotin 2,500 mcg cap Take  by mouth. Provider, Historical   B.infantis-B.ani-B.long-B.bifi (PROBIOTIC 4X) 10-15 mg TbEC Take  by mouth. Provider, Historical   cholecalciferol, vitamin D3, (VITAMIN D3) 2,000 unit tab Take  by mouth. Provider, Historical   calcium-cholecalciferol, d3, (CALCIUM 600 + D) 600-125 mg-unit tab Take  by mouth. Provider, Historical   cyanocobalamin (VITAMIN B-12) 100 mcg tablet Take 100 mcg by mouth daily. Provider, Historical   vit C/zinc/Kginseng/wayne/hrb62 (IMMUNE SUPPORT COMPLEX PO) Take  by mouth. Provider, Historical     Allergies   Allergen Reactions    Latex, Natural Rubber Anaphylaxis    Penicillins Swelling     Was given it in 1990 during surgery and told by the medical staff that she is allergic. She was unsure of the reaction she had to the drug.  Papaya Rash      Social History     Tobacco Use    Smoking status: Never Smoker    Smokeless tobacco: Never Used   Substance Use Topics    Alcohol use: Yes     Comment: Social      Family History   Problem Relation Age of Onset    Arthritis-osteo Mother     Diabetes Father     Heart Disease Father     Stroke Paternal Uncle     Diabetes Paternal Uncle     Stomach Cancer Paternal Uncle     Breast Cancer Neg Hx       Review of Systems  A comprehensive review of systems was negative except for that written in the HPI. Objective:     No data found. There were no vitals taken for this visit. General:  Alert, cooperative, no distress, appears stated age. Head:  Normocephalic, without obvious abnormality, atraumatic. Back:   Symmetric, no curvature. ROM normal. No CVA tenderness. Lungs:   Clear to auscultation bilaterally. Chest wall:  No tenderness or deformity. Heart:  Regular rate and rhythm, S1, S2 normal, no murmur, click, rub or gallop. Extremities: Extremities normal, atraumatic, no cyanosis or edema. Pulses: 2+ and symmetric all extremities. Skin: Skin color, texture, turgor normal. No rashes or lesions. Lymph nodes: Cervical, supraclavicular, and axillary nodes normal.   Neurologic: CNII-XII intact. Normal strength, sensation and reflexes throughout.            Assessment: Principal Problem:    Adhesive capsulitis of right shoulder (8/17/2019)    Active Problems:    Traumatic tear of right rotator cuff (8/17/2019)      Strain of muscle, fascia and tendon of long head of biceps, right arm, initial encounter (8/17/2019)      Superior glenoid labrum lesion of right shoulder (8/17/2019)      Degenerative joint disease of right acromioclavicular joint (8/17/2019)        Plan:     The various methods of treatment have been discussed with the patient and family. PATIENT HAS EXHAUSTED NON-OPERATIVE MODALITIES     After consideration of risks, benefits and other options for treatment, the patient has consented to surgical intervention.     SEE OFFICE NOTE    Marvin De Jesus MD

## 2019-08-22 ENCOUNTER — ANESTHESIA EVENT (OUTPATIENT)
Dept: SURGERY | Age: 49
End: 2019-08-22
Payer: COMMERCIAL

## 2019-08-23 ENCOUNTER — ANESTHESIA (OUTPATIENT)
Dept: SURGERY | Age: 49
End: 2019-08-23
Payer: COMMERCIAL

## 2019-08-23 ENCOUNTER — HOSPITAL ENCOUNTER (OUTPATIENT)
Age: 49
Setting detail: OUTPATIENT SURGERY
Discharge: HOME | End: 2019-08-23
Attending: ORTHOPAEDIC SURGERY | Admitting: ORTHOPAEDIC SURGERY
Payer: COMMERCIAL

## 2019-08-23 ENCOUNTER — APPOINTMENT (OUTPATIENT)
Dept: GENERAL RADIOLOGY | Age: 49
End: 2019-08-23
Attending: ORTHOPAEDIC SURGERY
Payer: COMMERCIAL

## 2019-08-23 VITALS
OXYGEN SATURATION: 95 % | WEIGHT: 167.3 LBS | HEIGHT: 66 IN | SYSTOLIC BLOOD PRESSURE: 141 MMHG | RESPIRATION RATE: 20 BRPM | DIASTOLIC BLOOD PRESSURE: 76 MMHG | TEMPERATURE: 97.2 F | BODY MASS INDEX: 26.89 KG/M2 | HEART RATE: 74 BPM

## 2019-08-23 PROBLEM — M94.211 CHONDROMALACIA OF RIGHT SHOULDER: Status: ACTIVE | Noted: 2019-08-23

## 2019-08-23 LAB
EST. AVERAGE GLUCOSE BLD GHB EST-MCNC: 146 MG/DL
GLUCOSE BLD STRIP.AUTO-MCNC: 111 MG/DL (ref 65–100)
HBA1C MFR BLD: 6.7 %

## 2019-08-23 PROCEDURE — 74011000250 HC RX REV CODE- 250: Performed by: ORTHOPAEDIC SURGERY

## 2019-08-23 PROCEDURE — 73030 X-RAY EXAM OF SHOULDER: CPT

## 2019-08-23 PROCEDURE — 83036 HEMOGLOBIN GLYCOSYLATED A1C: CPT

## 2019-08-23 PROCEDURE — 76210000020 HC REC RM PH II FIRST 0.5 HR: Performed by: ORTHOPAEDIC SURGERY

## 2019-08-23 PROCEDURE — 77030002986 HC SUT PROL J&J -A: Performed by: ORTHOPAEDIC SURGERY

## 2019-08-23 PROCEDURE — 74011250636 HC RX REV CODE- 250/636: Performed by: ANESTHESIOLOGY

## 2019-08-23 PROCEDURE — 74011000250 HC RX REV CODE- 250: Performed by: ANESTHESIOLOGY

## 2019-08-23 PROCEDURE — 77030033005 HC TBNG ARTHSC PMP STRY -B: Performed by: ORTHOPAEDIC SURGERY

## 2019-08-23 PROCEDURE — 77030004453 HC BUR SHV STRY -B: Performed by: ORTHOPAEDIC SURGERY

## 2019-08-23 PROCEDURE — 77030003602 HC NDL NRV BLK BBMI -B: Performed by: ANESTHESIOLOGY

## 2019-08-23 PROCEDURE — 77030003666 HC NDL SPINAL BD -A: Performed by: ORTHOPAEDIC SURGERY

## 2019-08-23 PROCEDURE — 77030037088 HC TUBE ENDOTRACH ORAL NSL COVD-A: Performed by: ANESTHESIOLOGY

## 2019-08-23 PROCEDURE — C1713 ANCHOR/SCREW BN/BN,TIS/BN: HCPCS | Performed by: ORTHOPAEDIC SURGERY

## 2019-08-23 PROCEDURE — 77030006891 HC BLD SHV RESECT STRY -B: Performed by: ORTHOPAEDIC SURGERY

## 2019-08-23 PROCEDURE — 74011250636 HC RX REV CODE- 250/636

## 2019-08-23 PROCEDURE — 77030006668 HC BLD SHV MENSCS STRY -B: Performed by: ORTHOPAEDIC SURGERY

## 2019-08-23 PROCEDURE — 76060000033 HC ANESTHESIA 1 TO 1.5 HR: Performed by: ORTHOPAEDIC SURGERY

## 2019-08-23 PROCEDURE — 77030018673: Performed by: ORTHOPAEDIC SURGERY

## 2019-08-23 PROCEDURE — 74011250637 HC RX REV CODE- 250/637: Performed by: ANESTHESIOLOGY

## 2019-08-23 PROCEDURE — 76942 ECHO GUIDE FOR BIOPSY: CPT | Performed by: ORTHOPAEDIC SURGERY

## 2019-08-23 PROCEDURE — 76010010054 HC POST OP PAIN BLOCK: Performed by: ORTHOPAEDIC SURGERY

## 2019-08-23 PROCEDURE — 77030018836 HC SOL IRR NACL ICUM -A: Performed by: ORTHOPAEDIC SURGERY

## 2019-08-23 PROCEDURE — A4565 SLINGS: HCPCS | Performed by: ORTHOPAEDIC SURGERY

## 2019-08-23 PROCEDURE — 77030002916 HC SUT ETHLN J&J -A: Performed by: ORTHOPAEDIC SURGERY

## 2019-08-23 PROCEDURE — 82962 GLUCOSE BLOOD TEST: CPT

## 2019-08-23 PROCEDURE — 74011000250 HC RX REV CODE- 250

## 2019-08-23 PROCEDURE — 74011250636 HC RX REV CODE- 250/636: Performed by: ORTHOPAEDIC SURGERY

## 2019-08-23 PROCEDURE — 77030031139 HC SUT VCRL2 J&J -A: Performed by: ORTHOPAEDIC SURGERY

## 2019-08-23 PROCEDURE — 77030027384 HC PRB ARTHSCP SERFAS STRY -C: Performed by: ORTHOPAEDIC SURGERY

## 2019-08-23 PROCEDURE — 77030016570 HC BLNKT BAIR HGGR 3M -B: Performed by: ANESTHESIOLOGY

## 2019-08-23 PROCEDURE — 76210000016 HC OR PH I REC 1 TO 1.5 HR: Performed by: ORTHOPAEDIC SURGERY

## 2019-08-23 PROCEDURE — 77030039425 HC BLD LARYNG TRULITE DISP TELE -A: Performed by: ANESTHESIOLOGY

## 2019-08-23 PROCEDURE — 77030018986 HC SUT ETHBND4 J&J -B: Performed by: ORTHOPAEDIC SURGERY

## 2019-08-23 PROCEDURE — 76010000161 HC OR TIME 1 TO 1.5 HR INTENSV-TIER 1: Performed by: ORTHOPAEDIC SURGERY

## 2019-08-23 DEVICE — ICONIX 2 NEEDLES WITH INTELLIBRAID TECHNOLOGY, 2.3MM ANCHOR WITH 2 STRANDS #2 FORCE FIBER
Type: IMPLANTABLE DEVICE | Site: SHOULDER | Status: FUNCTIONAL
Brand: ICONIX

## 2019-08-23 DEVICE — 5.5MM PEEK ZIP SUTURE ANCHOR WITH ¿ CIRCLE TAPER NEEDLES, #2 FORCE FIBER
Type: IMPLANTABLE DEVICE | Site: SHOULDER | Status: FUNCTIONAL
Brand: PEEK ZIP

## 2019-08-23 RX ORDER — LIDOCAINE HYDROCHLORIDE 20 MG/ML
INJECTION, SOLUTION EPIDURAL; INFILTRATION; INTRACAUDAL; PERINEURAL
Status: COMPLETED | OUTPATIENT
Start: 2019-08-23 | End: 2019-08-23

## 2019-08-23 RX ORDER — BUPIVACAINE HYDROCHLORIDE AND EPINEPHRINE 2.5; 5 MG/ML; UG/ML
INJECTION, SOLUTION EPIDURAL; INFILTRATION; INTRACAUDAL; PERINEURAL
Status: COMPLETED | OUTPATIENT
Start: 2019-08-23 | End: 2019-08-23

## 2019-08-23 RX ORDER — CEFAZOLIN SODIUM/WATER 2 G/20 ML
2 SYRINGE (ML) INTRAVENOUS ONCE
Status: DISCONTINUED | OUTPATIENT
Start: 2019-08-23 | End: 2019-08-23

## 2019-08-23 RX ORDER — SODIUM CHLORIDE, SODIUM LACTATE, POTASSIUM CHLORIDE, CALCIUM CHLORIDE 600; 310; 30; 20 MG/100ML; MG/100ML; MG/100ML; MG/100ML
100 INJECTION, SOLUTION INTRAVENOUS CONTINUOUS
Status: DISCONTINUED | OUTPATIENT
Start: 2019-08-23 | End: 2019-08-23 | Stop reason: HOSPADM

## 2019-08-23 RX ORDER — SODIUM CHLORIDE 0.9 % (FLUSH) 0.9 %
5-40 SYRINGE (ML) INJECTION AS NEEDED
Status: DISCONTINUED | OUTPATIENT
Start: 2019-08-23 | End: 2019-08-23 | Stop reason: HOSPADM

## 2019-08-23 RX ORDER — GLYCOPYRROLATE 0.2 MG/ML
INJECTION INTRAMUSCULAR; INTRAVENOUS AS NEEDED
Status: DISCONTINUED | OUTPATIENT
Start: 2019-08-23 | End: 2019-08-23 | Stop reason: HOSPADM

## 2019-08-23 RX ORDER — LIDOCAINE HYDROCHLORIDE 10 MG/ML
0.1 INJECTION INFILTRATION; PERINEURAL AS NEEDED
Status: DISCONTINUED | OUTPATIENT
Start: 2019-08-23 | End: 2019-08-23 | Stop reason: HOSPADM

## 2019-08-23 RX ORDER — SODIUM CHLORIDE 0.9 % (FLUSH) 0.9 %
5-40 SYRINGE (ML) INJECTION EVERY 8 HOURS
Status: DISCONTINUED | OUTPATIENT
Start: 2019-08-23 | End: 2019-08-23 | Stop reason: HOSPADM

## 2019-08-23 RX ORDER — FAMOTIDINE 20 MG/1
20 TABLET, FILM COATED ORAL ONCE
Status: COMPLETED | OUTPATIENT
Start: 2019-08-23 | End: 2019-08-23

## 2019-08-23 RX ORDER — DEXAMETHASONE SODIUM PHOSPHATE 4 MG/ML
INJECTION, SOLUTION INTRA-ARTICULAR; INTRALESIONAL; INTRAMUSCULAR; INTRAVENOUS; SOFT TISSUE AS NEEDED
Status: DISCONTINUED | OUTPATIENT
Start: 2019-08-23 | End: 2019-08-23 | Stop reason: HOSPADM

## 2019-08-23 RX ORDER — NEOSTIGMINE METHYLSULFATE 1 MG/ML
INJECTION INTRAVENOUS AS NEEDED
Status: DISCONTINUED | OUTPATIENT
Start: 2019-08-23 | End: 2019-08-23 | Stop reason: HOSPADM

## 2019-08-23 RX ORDER — ROCURONIUM BROMIDE 10 MG/ML
INJECTION, SOLUTION INTRAVENOUS AS NEEDED
Status: DISCONTINUED | OUTPATIENT
Start: 2019-08-23 | End: 2019-08-23 | Stop reason: HOSPADM

## 2019-08-23 RX ORDER — DIPHENHYDRAMINE HYDROCHLORIDE 50 MG/ML
12.5 INJECTION, SOLUTION INTRAMUSCULAR; INTRAVENOUS ONCE
Status: DISCONTINUED | OUTPATIENT
Start: 2019-08-23 | End: 2019-08-23 | Stop reason: HOSPADM

## 2019-08-23 RX ORDER — SODIUM CHLORIDE 9 MG/ML
50 INJECTION, SOLUTION INTRAVENOUS CONTINUOUS
Status: DISCONTINUED | OUTPATIENT
Start: 2019-08-23 | End: 2019-08-23 | Stop reason: HOSPADM

## 2019-08-23 RX ORDER — ONDANSETRON 2 MG/ML
INJECTION INTRAMUSCULAR; INTRAVENOUS AS NEEDED
Status: DISCONTINUED | OUTPATIENT
Start: 2019-08-23 | End: 2019-08-23 | Stop reason: HOSPADM

## 2019-08-23 RX ORDER — PROPOFOL 10 MG/ML
INJECTION, EMULSION INTRAVENOUS AS NEEDED
Status: DISCONTINUED | OUTPATIENT
Start: 2019-08-23 | End: 2019-08-23 | Stop reason: HOSPADM

## 2019-08-23 RX ORDER — LIDOCAINE HYDROCHLORIDE AND EPINEPHRINE 10; 10 MG/ML; UG/ML
INJECTION, SOLUTION INFILTRATION; PERINEURAL AS NEEDED
Status: DISCONTINUED | OUTPATIENT
Start: 2019-08-23 | End: 2019-08-23 | Stop reason: HOSPADM

## 2019-08-23 RX ORDER — FENTANYL CITRATE 50 UG/ML
INJECTION, SOLUTION INTRAMUSCULAR; INTRAVENOUS AS NEEDED
Status: DISCONTINUED | OUTPATIENT
Start: 2019-08-23 | End: 2019-08-23 | Stop reason: HOSPADM

## 2019-08-23 RX ORDER — OXYCODONE HYDROCHLORIDE 5 MG/1
5 TABLET ORAL
Status: DISCONTINUED | OUTPATIENT
Start: 2019-08-23 | End: 2019-08-23 | Stop reason: HOSPADM

## 2019-08-23 RX ORDER — OXYCODONE AND ACETAMINOPHEN 5; 325 MG/1; MG/1
1 TABLET ORAL AS NEEDED
Status: DISCONTINUED | OUTPATIENT
Start: 2019-08-23 | End: 2019-08-23 | Stop reason: HOSPADM

## 2019-08-23 RX ORDER — FENTANYL CITRATE 50 UG/ML
25 INJECTION, SOLUTION INTRAMUSCULAR; INTRAVENOUS ONCE
Status: COMPLETED | OUTPATIENT
Start: 2019-08-23 | End: 2019-08-23

## 2019-08-23 RX ORDER — LIDOCAINE HYDROCHLORIDE 20 MG/ML
INJECTION, SOLUTION EPIDURAL; INFILTRATION; INTRACAUDAL; PERINEURAL AS NEEDED
Status: DISCONTINUED | OUTPATIENT
Start: 2019-08-23 | End: 2019-08-23 | Stop reason: HOSPADM

## 2019-08-23 RX ORDER — BUPIVACAINE HYDROCHLORIDE AND EPINEPHRINE 5; 5 MG/ML; UG/ML
INJECTION, SOLUTION EPIDURAL; INTRACAUDAL; PERINEURAL
Status: COMPLETED | OUTPATIENT
Start: 2019-08-23 | End: 2019-08-23

## 2019-08-23 RX ORDER — MIDAZOLAM HYDROCHLORIDE 1 MG/ML
2 INJECTION, SOLUTION INTRAMUSCULAR; INTRAVENOUS ONCE
Status: COMPLETED | OUTPATIENT
Start: 2019-08-23 | End: 2019-08-23

## 2019-08-23 RX ORDER — SUCCINYLCHOLINE CHLORIDE 20 MG/ML
INJECTION INTRAMUSCULAR; INTRAVENOUS AS NEEDED
Status: DISCONTINUED | OUTPATIENT
Start: 2019-08-23 | End: 2019-08-23 | Stop reason: HOSPADM

## 2019-08-23 RX ORDER — HYDROMORPHONE HYDROCHLORIDE 2 MG/ML
0.5 INJECTION, SOLUTION INTRAMUSCULAR; INTRAVENOUS; SUBCUTANEOUS
Status: DISCONTINUED | OUTPATIENT
Start: 2019-08-23 | End: 2019-08-23 | Stop reason: HOSPADM

## 2019-08-23 RX ORDER — MIDAZOLAM HYDROCHLORIDE 1 MG/ML
2 INJECTION, SOLUTION INTRAMUSCULAR; INTRAVENOUS
Status: DISCONTINUED | OUTPATIENT
Start: 2019-08-23 | End: 2019-08-23 | Stop reason: HOSPADM

## 2019-08-23 RX ADMIN — FENTANYL CITRATE 25 MCG: 50 INJECTION INTRAMUSCULAR; INTRAVENOUS at 07:13

## 2019-08-23 RX ADMIN — NEOSTIGMINE METHYLSULFATE 3 MG: 1 INJECTION INTRAVENOUS at 08:57

## 2019-08-23 RX ADMIN — FENTANYL CITRATE 50 MCG: 50 INJECTION, SOLUTION INTRAMUSCULAR; INTRAVENOUS at 08:14

## 2019-08-23 RX ADMIN — ROCURONIUM BROMIDE 10 MG: 10 INJECTION, SOLUTION INTRAVENOUS at 07:51

## 2019-08-23 RX ADMIN — SODIUM CHLORIDE, SODIUM LACTATE, POTASSIUM CHLORIDE, AND CALCIUM CHLORIDE 100 ML/HR: 600; 310; 30; 20 INJECTION, SOLUTION INTRAVENOUS at 06:05

## 2019-08-23 RX ADMIN — MIDAZOLAM 2 MG: 1 INJECTION INTRAMUSCULAR; INTRAVENOUS at 07:13

## 2019-08-23 RX ADMIN — DEXAMETHASONE SODIUM PHOSPHATE 10 MG: 4 INJECTION, SOLUTION INTRA-ARTICULAR; INTRALESIONAL; INTRAMUSCULAR; INTRAVENOUS; SOFT TISSUE at 08:03

## 2019-08-23 RX ADMIN — LIDOCAINE HYDROCHLORIDE 10 ML: 20 INJECTION, SOLUTION EPIDURAL; INFILTRATION; INTRACAUDAL; PERINEURAL at 07:18

## 2019-08-23 RX ADMIN — HYDROMORPHONE HYDROCHLORIDE 0.5 MG: 2 INJECTION INTRAMUSCULAR; INTRAVENOUS; SUBCUTANEOUS at 09:25

## 2019-08-23 RX ADMIN — PROPOFOL 20 MG: 10 INJECTION, EMULSION INTRAVENOUS at 07:13

## 2019-08-23 RX ADMIN — LIDOCAINE HYDROCHLORIDE 100 MG: 20 INJECTION, SOLUTION EPIDURAL; INFILTRATION; INTRACAUDAL; PERINEURAL at 07:51

## 2019-08-23 RX ADMIN — SODIUM CHLORIDE, SODIUM LACTATE, POTASSIUM CHLORIDE, AND CALCIUM CHLORIDE: 600; 310; 30; 20 INJECTION, SOLUTION INTRAVENOUS at 08:43

## 2019-08-23 RX ADMIN — ONDANSETRON 4 MG: 2 INJECTION INTRAMUSCULAR; INTRAVENOUS at 08:48

## 2019-08-23 RX ADMIN — PROPOFOL 200 MG: 10 INJECTION, EMULSION INTRAVENOUS at 07:51

## 2019-08-23 RX ADMIN — SUCCINYLCHOLINE CHLORIDE 160 MG: 20 INJECTION INTRAMUSCULAR; INTRAVENOUS at 07:51

## 2019-08-23 RX ADMIN — GLYCOPYRROLATE 0.4 MG: 0.2 INJECTION INTRAMUSCULAR; INTRAVENOUS at 08:57

## 2019-08-23 RX ADMIN — HYDROMORPHONE HYDROCHLORIDE 0.5 MG: 2 INJECTION INTRAMUSCULAR; INTRAVENOUS; SUBCUTANEOUS at 09:20

## 2019-08-23 RX ADMIN — FAMOTIDINE 20 MG: 20 TABLET, FILM COATED ORAL at 05:57

## 2019-08-23 RX ADMIN — BUPIVACAINE HYDROCHLORIDE AND EPINEPHRINE 20 ML: 5; 5 INJECTION, SOLUTION EPIDURAL; INTRACAUDAL; PERINEURAL at 07:18

## 2019-08-23 RX ADMIN — BUPIVACAINE HYDROCHLORIDE AND EPINEPHRINE 20 ML: 2.5; 5 INJECTION, SOLUTION EPIDURAL; INFILTRATION; INTRACAUDAL; PERINEURAL at 07:18

## 2019-08-23 RX ADMIN — PROMETHAZINE HYDROCHLORIDE 6.25 MG: 25 INJECTION INTRAMUSCULAR; INTRAVENOUS at 09:25

## 2019-08-23 RX ADMIN — VANCOMYCIN HYDROCHLORIDE 1000 MG: 1 INJECTION, POWDER, LYOPHILIZED, FOR SOLUTION INTRAVENOUS at 06:22

## 2019-08-23 RX ADMIN — PROPOFOL 50 MG: 10 INJECTION, EMULSION INTRAVENOUS at 08:20

## 2019-08-23 RX ADMIN — ROCURONIUM BROMIDE 25 MG: 10 INJECTION, SOLUTION INTRAVENOUS at 07:58

## 2019-08-23 NOTE — ANESTHESIA POSTPROCEDURE EVALUATION
Procedure(s):  EXAMINATION AND MANIPULATION RIGHT SHOULDER ARTHROSCOPY RIGHT SHOULDER ARTHROSCOPIC SUBACROMIAL DECOMPRESSION, DISTAL CLAVICLE RESECTION, LYSIS OF ADHESIONS, MINI OPEN ROTATOR CUFF REPAIR, BICEPS TENODESIS.     general    Anesthesia Post Evaluation      Multimodal analgesia: multimodal analgesia used between 6 hours prior to anesthesia start to PACU discharge  Patient location during evaluation: bedside  Patient participation: complete - patient participated  Level of consciousness: awake  Pain management: adequate  Airway patency: patent  Anesthetic complications: no  Cardiovascular status: acceptable and stable  Respiratory status: acceptable and room air  Hydration status: acceptable  Post anesthesia nausea and vomiting:  none      Vitals Value Taken Time   /62 8/23/2019  9:55 AM   Temp 36.2 °C (97.2 °F) 8/23/2019  9:45 AM   Pulse 82 8/23/2019  9:55 AM   Resp 20 8/23/2019  9:55 AM   SpO2 93 % 8/23/2019  9:55 AM

## 2019-08-23 NOTE — DISCHARGE INSTRUCTIONS
INSTRUCTIONS FOLLOWING ARTHROSCOPY SURGERY  Dr. Jerald Scruggs [phone]    ACTIVITY   As tolerated and as directed by your doctor   Elevate surgery site first 48 hours.  Use arm sling or crutches per your doctors instructions.  Bathe or shower as directed by your doctor. DIET   Clear liquids until no nausea or vomiting; then light diet for the first day   Advance to regular diet on second day, unless your doctor orders otherwise.  If nausea and vomiting continues, call your doctor. PAIN   Take pain medication as directed by your doctor.  Call your doctor if pain is NOT relieved by medication.  DO NOT take aspirin or blood thinners until directed by your doctor. DRESSING CARE: Follow all dressing care instructions provided by Dr. Jose Luis Vasquez will be made by nursing staff.  If you have any problems or concerns, call your doctor as needed. CALL YOUR DOCTOR IF   Excessive bleeding that does not stop after holding mild pressure over the area   Temperature of 101°F or above   Redness, excessive swelling or bruising, and/or green or yellow, smelly discharge from incision    AFTER ANESTHESIA   For the next 24 hours: DO NOT Drive, Drink alcoholic beverages, or Make important decisions.  Be aware of dizziness following anesthesia and while taking pain medication.

## 2019-08-23 NOTE — H&P
Date of Surgery Update:  Justine Luis was seen and examined. History and physical has been reviewed. The patient has been examined.  There have been no significant clinical changes since the completion of the originally dated History and Physical.    Signed By: Joshua Joy., MD     August 23, 2019 6:20 AM

## 2019-08-23 NOTE — ANESTHESIA PREPROCEDURE EVALUATION
Relevant Problems   No relevant active problems       Anesthetic History   No history of anesthetic complications            Review of Systems / Medical History  Patient summary reviewed and pertinent labs reviewed    Pulmonary  Within defined limits                 Neuro/Psych         Headaches     Cardiovascular                  Exercise tolerance: >4 METS     GI/Hepatic/Renal  Within defined limits              Endo/Other        Arthritis and cancer (ovarian)     Other Findings              Physical Exam    Airway  Mallampati: II  TM Distance: 4 - 6 cm  Neck ROM: normal range of motion   Mouth opening: Normal     Cardiovascular  Regular rate and rhythm,  S1 and S2 normal,  no murmur, click, rub, or gallop  Rhythm: regular  Rate: normal         Dental  No notable dental hx       Pulmonary  Breath sounds clear to auscultation               Abdominal         Other Findings            Anesthetic Plan    ASA: 2  Anesthesia type: general      Post-op pain plan if not by surgeon: peripheral nerve block single    Induction: Intravenous  Anesthetic plan and risks discussed with: Patient

## 2019-08-23 NOTE — ANESTHESIA PROCEDURE NOTES
Peripheral Block    Start time: 8/23/2019 7:13 AM  End time: 8/23/2019 7:18 AM  Performed by: Andrew Acosta MD  Authorized by: Andrew Acosta MD       Pre-procedure: Indications: at surgeon's request and post-op pain management    Preanesthetic Checklist: patient identified, risks and benefits discussed, site marked, timeout performed, anesthesia consent given and patient being monitored    Timeout Time: 07:13          Block Type:   Block Type:   Interscalene  Laterality:  Right  Monitoring:  Standard ASA monitoring, continuous pulse ox, frequent vital sign checks, heart rate, oxygen and responsive to questions  Injection Technique:  Single shot  Procedures: ultrasound guided and nerve stimulator    Patient Position: supine  Prep: chlorhexidine    Needle Type:  Stimuplex  Needle Gauge:  22 G  Needle Localization:  Nerve stimulator and ultrasound guidance  Motor Response: minimal motor response >0.4 mA      Assessment:  Number of attempts:  1  Injection Assessment:  Local visualized surrounding nerve on ultrasound, negative aspiration for CSF, negative aspiration for blood, no paresthesia, no intravascular symptoms, ultrasound image on chart and incremental injection every 5 mL  Patient tolerance:  Patient tolerated the procedure well with no immediate complications

## 2019-08-24 NOTE — OP NOTES
[address] [address]  OPERATIVE REPORT    Name:  Fior Baker  MR#:  [id number]  :  1970  ACCOUNT #:  [de-identified]  DATE OF SERVICE:  2019    PREOPERATIVE DIAGNOSES:  1. Rotator cuff sprain, right shoulder. 2.  Bicipital strain, right shoulder. 3.  Superior labrum anterior posterior tear, right shoulder. 4.  Adhesive capsulitis, right shoulder. 5.  Acromioclavicular joint arthritis, right shoulder. POSTOPERATIVE DIAGNOSES:  1. Rotator cuff sprain, right shoulder. 2.  Bicipital strain, right shoulder. 3.  Superior labrum anterior posterior tear, right shoulder. 4.  Adhesive capsulitis, right shoulder. 5.  Acromioclavicular joint arthritis, right shoulder. 6.  Glenohumeral chondromalacia, right shoulder. PROCEDURE PERFORMED:  Examination and manipulation of right shoulder, arthroscopy of right shoulder, arthroscopic subacromial decompression, arthroscopic distal clavicle resection, lysis of adhesions, mini-open rotator cuff repair and biceps tenodesis. SURGEON:  Karl Wyman. Wale Alexander MD    CERTIFIED FIRST ASSISTANT:  Martha Del Toro Certified First Assistant. Use of a certified first assistant was necessary to optimize the patient's safety and outcome. ANESTHESIA:  General with an interscalene block. COMPLICATIONS:  None. IMPLANTS:  Hardware utilized are two Tyree 5.5 anchors and one Iconix 2.3 anchor. ESTIMATED BLOOD LOSS:  20 mL. PATHOLOGY:  1. Type 2 acromion. 2.  AC joint arthritis. 3.  Type 2 SLAP tear. 4.  Bicipital strain. 5.  Grade II-III chondromalacia, humeral head. 6.  High-grade partial thickness rotator cuff tear. 7.  Capsulitis. CPT CODES:  [id number], [id number], [id number], [id number], and [id number]. ICD-10 CODES:  I56.026, S46.111, S43.421, M75.01, M19.011, and M94.211. INDICATIONS:  The patient is a 68-year-old female, who slipped and fell in the bathroom loading her right shoulder.   Preoperative physical exam, radiographs, and an MR demonstrate a rotator cuff sprain, bicipital strain, SLAP tear, AC joint arthritis. The patient has developed sore, painful, stiff right shoulder. She is now electively admitted for operative intervention. PROCEDURE:  Following identification of the patient, the patient was taken to the operative suite. Following administration of general anesthesia, interscalene block for postop pain control, 1 g of IV vancomycin, measurement of hemoglobin A1c and fasting blood glucose 6.7 and 111 respectively, the patient was positioned on the operating room table in the supine fashion. Right shoulder was examined under anesthesia. The patient was noted to have 0-130 degrees passive forward elevation, 20 degrees of external rotation to the side, and 60 degrees of external and internal rotation in the 90-degree abducted position. At this point, a gentle manipulation of the right shoulder was then performed. I was able to achieve 0-180 degrees of passive forward elevation, 60 degrees of external rotation to the side, and 90 degrees of external and internal rotation in the 90-degree abducted position. At this point, the patient was carefully positioned in the lateral decubitus position left side down. Axillary roll was placed. Beanbag was inflated. Care was taken to pad both dependent lower and upper extremities. Right arm was then placed in the Dyonics traction device in 15 pounds of traction. Right shoulder was then prepped and draped in sterile fashion. Subacromial space was injected with 20 mL of 1% Xylocaine with epinephrine. Scope was introduced into the shoulder. Diagnostic arthroscopy was then commenced. Humeral head and glenoid were visualized. The glenoid was intact. There were grade II-III changes of the humeral head. There was diffuse capsulitis throughout the glenohumeral joint. There was a type 2 SLAP tear superiorly with an unstable biceps anchor and the tear extended into the biceps.   There was a high-grade partial thickness articular surface supraspinatus rotator cuff tear. Subscapularis, posterior supraspinatus, infraspinatus, and teres minor were intact. Scope was then flip-flopped from the posterior to anterior portal.  Posterior cuff and labrum were visualized. Capsulitis was confirmed. Type 2 SLAP tear was confirmed. Posterior cuff was intact. At this point, with the use of an Oratec wand and a 4.5 full resector, all adhesions were lysed in the glenohumeral joint and extensive debridement was performed. Scope was then placed back into the posterior portal.  Anterior capsule was then released as well. All adhesions were lysed. There was abundant mobility in the axillary recess. Scope was then placed into the subacromial space. Lateral portal was then established. Hypertrophic hemorrhagic bursal tissue was then resected. There was no evident full-thickness rotator cuff tear, but there was significant bursitis and a complete bursectomy and all adhesions were lysed on the bursal side. At this point, using Oratec wand and acromionizer narinder, an arthroscopic subacromial decompression was then performed. This was taken down to the level of the deltoid fascia anteriorly, AC joint posteriorly and contoured from medial to lateral.  Once this was then complete, our attention was then turned to resecting the distal clavicle. The distal 10 mm and 10 mm of the distal clavicle was then resected. Care was taken to preserve the posterior and superior capsules. At this point, given the combination of pathology, it was elected to perform a mini-open approach. The lateral portal was extended to 3 cm. Deltoid split was carried up to the acromion. The biceps tendon was identified. It was dissected free. It was markedly tendinopathic. It was brought out to length, tagged, transected, and tenodesed utilizing one 5.5 Lee anchor and an additional 2.3 Iconix anchor. This yielded an excellent biceps tenodesis.   An additional 5.5 anchor was placed in the greater tuberosity. All limbs of all sutures were passed and secured through the rotator cuff yielding an excellent cuff repair. The scope was then placed back into the glenohumeral joint. Stump of the biceps was debrided back to stable  structures. SLAP tear was debrided as well. Undersurface of the rotator cuff was visualized. Anatomical footprint was re-established. Scope was then placed back on the bursal side. Bursal side of the cuff repair was stable. Biceps tenodesis was stable. At this point, with the procedure complete, arthroscopic equipment was removed from the shoulder. The portals were approximated using 2-0 nylon horizontal mattress sutures. Lateral wound was closed with 0 Vicryl figure-of-eight sutures and a 2-0 Prolene subcuticular stitch. A sterile dressing was applied. A sling and swathe was applied. The patient was then transferred to the recovery room in stable condition. Abdoul Kendall MD      [id number]  D:  08/23/2019 9:16  T:  08/23/2019 11:06  JOB #:  [id number]  CC:   Dayana Goodman MD

## 2019-08-28 NOTE — THERAPY DISCHARGE
Lillie Zamorano  : 1970  Primary: Megtheodora Call Bulmaroradha Rpn  Secondary:  [address]  at [facility], [address] [facility], [place] [address], [address] [facility]  [phone]   [phone]          OUTPATIENT PHYSICAL THERAPY:Discontinuation Summary    ICD-10: Treatment Diagnosis:   · Pain in right shoulder (M25.511)  · Stiffness of right shoulder, not elsewhere classified (M25.611)  Precautions/Allergies:   Latex, natural rubber; Penicillins; and Papaya   TREATMENT PLAN:  Effective Dates: 2019 TO 2019 (90 days). Frequency/Duration: 2 times a week for 90 Day(s) MEDICAL/REFERRING DIAGNOSIS:  Strain of muscle(s) and tendon(s) of the rotator cuff of right shoulder, initial encounter [S46.011A]   DATE OF ONSET: Chronic  REFERRING PHYSICIAN: Jaime Gordon NP MD Orders: Evaluate and Treat  Return MD Appointment: TBD     ASSESSMENT:  Ms. Arabella Ty presented with decreased mobility, decreased strength, and pain in right shoulder secondary to degenerative changes. Patient has attended a total of 6 scheduled physical therapy visits including initial evaluation on 2019. Treatment has consisted of mobility and strengthening activities to improve overall mobility, pain, and performance with activities of daily living. GOALS: (Goals have been discussed and agreed upon with patient.)  Short-Term Functional Goals: Time Frame: 30 days  1. Patient will be independent with home exercise program without exacerbation of symptoms or cueing needed--goal met. 2. Patient will be independent with correct sleeping positions and awareness/avoidance of aggravating positions without cueing needed--goal met. Discharge Goals: Time Frame: 90 days  1. Patient will be independent with all ADLs with minimal onset of right shoulder pain and no deficits with daily tasks--goal unmet.   2. Patient will report no fear avoidance with social or recreational activities due to right shoulder pain --goal unmet.  3. Patient will score less than or equal to 19/55 on Quick DASH with minimal effect of right shoulder pain on patient's ability to manage every day life activities--goal unmet. OUTCOME MEASURE:   Tool Used: Disabilities of the Arm, Shoulder and Hand (DASH) Questionnaire - Quick Version  Score:  Initial: 29/55  Most Recent: X/55 (Date: -- )   Interpretation of Score: The DASH is designed to measure the activities of daily living in person's with upper extremity dysfunction or pain. Each section is scored on a 1-5 scale, 5 representing the greatest disability. The scores of each section are added together for a total score of 55. EXAMINATION:   Observation/Orthostatic Postural Assessment:          Posture Assessment: Rounded shoulders, Forward head   Palpation:          Tightness and tenderness to palpation noted in right upper trapezius, levator scapulae, deltoid, and biceps region of right upper extremity. No bruising or swelling noted. ROM:          R UE Assessment (AROM):  · Shoulder Flexion: 100 degrees  · Shoulder Extension: 10 degrees  · Shoulder Abduction: 70 degrees  · Shoulder Adduction: 0 degrees  · Shoulder Internal rotation: 70 degrees  · Shoulder External rotation: 70 degrees  · Elbow Flexion: 120 degrees  · Elbow Extension: 0 degrees  Strength:          R UE Assessment (Strength): · Shoulder Flexion: 3/5 with manual muscle testing  · Shoulder Extension: 3/5 with manual muscle testing  · Shoulder Abduction: 3/5 with manual muscle testing  · Shoulder Adduction: 3/5 with manual muscle testing  · Shoulder Internal rotation: 3/5 with manual muscle testing  · Shoulder External rotation: 3/5 with manual muscle testing  · Elbow Flexion: 4/5 with manual muscle testing  · Elbow Extension: 4/5 with manual muscle testing  Special Tests:          Negative  Neurological Screen:        Dermatomes:   Within normal limits        Reflexes:  2+  Functional Mobility:         Independent

## 2020-07-10 ENCOUNTER — HOSPITAL ENCOUNTER (OUTPATIENT)
Dept: MAMMOGRAPHY | Age: 50
Discharge: HOME | End: 2020-07-10
Attending: INTERNAL MEDICINE
Payer: COMMERCIAL

## 2020-07-10 DIAGNOSIS — Z12.31 VISIT FOR SCREENING MAMMOGRAM: ICD-10-CM

## 2020-07-10 PROCEDURE — 77063 BREAST TOMOSYNTHESIS BI: CPT

## 2021-01-27 ENCOUNTER — HOSPITAL ENCOUNTER (OUTPATIENT)
Dept: GENERAL RADIOLOGY | Age: 51
Discharge: HOME | End: 2021-01-27
Payer: COMMERCIAL

## 2021-01-27 DIAGNOSIS — M25.562 ACUTE PAIN OF LEFT KNEE: ICD-10-CM

## 2021-01-27 PROBLEM — F41.1 GENERALIZED ANXIETY DISORDER: Status: ACTIVE | Noted: 2021-01-27

## 2021-01-27 PROCEDURE — 73560 X-RAY EXAM OF KNEE 1 OR 2: CPT

## 2021-01-27 PROCEDURE — 73030 X-RAY EXAM OF SHOULDER: CPT

## 2021-06-03 PROBLEM — F41.1 GENERALIZED ANXIETY DISORDER: Status: RESOLVED | Noted: 2021-01-27 | Resolved: 2021-06-03

## 2021-06-03 PROBLEM — R73.01 IFG (IMPAIRED FASTING GLUCOSE): Status: ACTIVE | Noted: 2021-06-03

## 2021-06-09 ENCOUNTER — TRANSCRIBE ORDER (OUTPATIENT)
Dept: SCHEDULING | Age: 51
End: 2021-06-09

## 2021-06-09 DIAGNOSIS — Z12.31 SCREENING MAMMOGRAM FOR HIGH-RISK PATIENT: Primary | ICD-10-CM

## 2021-07-15 ENCOUNTER — HOSPITAL ENCOUNTER (OUTPATIENT)
Dept: MAMMOGRAPHY | Age: 51
Discharge: HOME | End: 2021-07-15
Attending: OBSTETRICS & GYNECOLOGY
Payer: COMMERCIAL

## 2021-07-15 DIAGNOSIS — Z12.31 SCREENING MAMMOGRAM FOR HIGH-RISK PATIENT: ICD-10-CM

## 2021-07-15 PROCEDURE — 77063 BREAST TOMOSYNTHESIS BI: CPT

## 2021-09-17 ENCOUNTER — HOSPITAL ENCOUNTER (OUTPATIENT)
Dept: GENERAL RADIOLOGY | Age: 51
Discharge: HOME | End: 2021-09-17
Attending: NURSE PRACTITIONER
Payer: COMMERCIAL

## 2021-09-17 DIAGNOSIS — J20.9 ACUTE BRONCHITIS, UNSPECIFIED ORGANISM: ICD-10-CM

## 2021-09-17 PROCEDURE — 71046 X-RAY EXAM CHEST 2 VIEWS: CPT

## 2022-03-07 ENCOUNTER — TRANSCRIBE ORDER (OUTPATIENT)
Dept: SCHEDULING | Age: 52
End: 2022-03-07

## 2022-03-07 DIAGNOSIS — Z12.31 VISIT FOR SCREENING MAMMOGRAM: Primary | ICD-10-CM

## 2022-03-18 PROBLEM — S46.011A TRAUMATIC TEAR OF RIGHT ROTATOR CUFF: Status: ACTIVE | Noted: 2019-08-17

## 2022-03-18 PROBLEM — M94.211 CHONDROMALACIA OF RIGHT SHOULDER: Status: ACTIVE | Noted: 2019-08-23

## 2022-03-19 PROBLEM — Z85.43 HISTORY OF OVARIAN CANCER: Status: ACTIVE | Noted: 2019-02-20

## 2022-03-19 PROBLEM — M19.011 DEGENERATIVE JOINT DISEASE OF RIGHT ACROMIOCLAVICULAR JOINT: Status: ACTIVE | Noted: 2019-08-17

## 2022-03-19 PROBLEM — S43.431A SUPERIOR GLENOID LABRUM LESION OF RIGHT SHOULDER: Status: ACTIVE | Noted: 2019-08-17

## 2022-03-19 PROBLEM — S46.111A STRAIN OF MUSCLE, FASCIA AND TENDON OF LONG HEAD OF BICEPS, RIGHT ARM, INITIAL ENCOUNTER: Status: ACTIVE | Noted: 2019-08-17

## 2022-03-19 PROBLEM — J30.1 SEASONAL ALLERGIC RHINITIS DUE TO POLLEN: Status: ACTIVE | Noted: 2019-02-20

## 2022-03-19 PROBLEM — G43.C1 INTRACTABLE PERIODIC HEADACHE SYNDROME: Status: ACTIVE | Noted: 2019-02-20

## 2022-03-19 PROBLEM — M75.01 ADHESIVE CAPSULITIS OF RIGHT SHOULDER: Status: ACTIVE | Noted: 2019-08-17

## 2022-03-20 PROBLEM — R73.01 IFG (IMPAIRED FASTING GLUCOSE): Status: ACTIVE | Noted: 2021-06-03

## 2022-03-20 PROBLEM — E78.2 MIXED HYPERLIPIDEMIA: Status: ACTIVE | Noted: 2019-02-20

## 2022-03-20 PROBLEM — F41.9 ANXIETY: Status: ACTIVE | Noted: 2019-02-20

## 2022-05-31 ENCOUNTER — TELEPHONE (OUTPATIENT)
Dept: INTERNAL MEDICINE CLINIC | Facility: CLINIC | Age: 52
End: 2022-05-31

## 2022-05-31 DIAGNOSIS — R73.03 PREDIABETES: Primary | ICD-10-CM

## 2022-05-31 DIAGNOSIS — E78.2 MIXED HYPERLIPIDEMIA: ICD-10-CM

## 2022-06-02 ENCOUNTER — NURSE ONLY (OUTPATIENT)
Dept: INTERNAL MEDICINE CLINIC | Facility: CLINIC | Age: 52
End: 2022-06-02

## 2022-06-02 DIAGNOSIS — R73.03 PREDIABETES: ICD-10-CM

## 2022-06-02 DIAGNOSIS — E78.2 MIXED HYPERLIPIDEMIA: ICD-10-CM

## 2022-06-03 LAB
ALBUMIN SERPL-MCNC: 3.9 G/DL (ref 3.5–5)
ALBUMIN/GLOB SERPL: 1.1 {RATIO} (ref 1.2–3.5)
ALP SERPL-CCNC: 103 U/L (ref 50–136)
ALT SERPL-CCNC: 33 U/L (ref 12–65)
ANION GAP SERPL CALC-SCNC: 5 MMOL/L (ref 7–16)
APPEARANCE UR: CLEAR
AST SERPL-CCNC: 18 U/L (ref 15–37)
BASOPHILS # BLD: 0.1 K/UL (ref 0–0.2)
BASOPHILS NFR BLD: 1 % (ref 0–2)
BILIRUB SERPL-MCNC: 0.4 MG/DL (ref 0.2–1.1)
BILIRUB UR QL: NEGATIVE
BUN SERPL-MCNC: 16 MG/DL (ref 6–23)
CALCIUM SERPL-MCNC: 9.7 MG/DL (ref 8.3–10.4)
CHLORIDE SERPL-SCNC: 104 MMOL/L (ref 98–107)
CHOLEST SERPL-MCNC: 253 MG/DL
CO2 SERPL-SCNC: 29 MMOL/L (ref 21–32)
COLOR UR: YELLOW
CREAT SERPL-MCNC: 0.7 MG/DL (ref 0.6–1)
DIFFERENTIAL METHOD BLD: NORMAL
EOSINOPHIL # BLD: 0.1 K/UL (ref 0–0.8)
EOSINOPHIL NFR BLD: 2 % (ref 0.5–7.8)
ERYTHROCYTE [DISTWIDTH] IN BLOOD BY AUTOMATED COUNT: 13.2 % (ref 11.9–14.6)
EST. AVERAGE GLUCOSE BLD GHB EST-MCNC: 126 MG/DL
GLOBULIN SER CALC-MCNC: 3.4 G/DL (ref 2.3–3.5)
GLUCOSE SERPL-MCNC: 102 MG/DL (ref 65–100)
GLUCOSE UR STRIP.AUTO-MCNC: NEGATIVE MG/DL
HBA1C MFR BLD: 6 % (ref 4.2–6.3)
HCT VFR BLD AUTO: 42.2 % (ref 35.8–46.3)
HDLC SERPL-MCNC: 79 MG/DL (ref 40–60)
HDLC SERPL: 3.2 {RATIO}
HGB BLD-MCNC: 13.6 G/DL (ref 11.7–15.4)
HGB UR QL STRIP: NEGATIVE
IMM GRANULOCYTES # BLD AUTO: 0 K/UL (ref 0–0.5)
IMM GRANULOCYTES NFR BLD AUTO: 0 % (ref 0–5)
KETONES UR QL STRIP.AUTO: NEGATIVE MG/DL
LDLC SERPL CALC-MCNC: 150.2 MG/DL
LEUKOCYTE ESTERASE UR QL STRIP.AUTO: NEGATIVE
LYMPHOCYTES # BLD: 2.2 K/UL (ref 0.5–4.6)
LYMPHOCYTES NFR BLD: 37 % (ref 13–44)
MCH RBC QN AUTO: 28.4 PG (ref 26.1–32.9)
MCHC RBC AUTO-ENTMCNC: 32.2 G/DL (ref 31.4–35)
MCV RBC AUTO: 88.1 FL (ref 79.6–97.8)
MONOCYTES # BLD: 0.4 K/UL (ref 0.1–1.3)
MONOCYTES NFR BLD: 8 % (ref 4–12)
NEUTS SEG # BLD: 3.1 K/UL (ref 1.7–8.2)
NEUTS SEG NFR BLD: 52 % (ref 43–78)
NITRITE UR QL STRIP.AUTO: NEGATIVE
NRBC # BLD: 0 K/UL (ref 0–0.2)
PH UR STRIP: 5.5 [PH] (ref 5–9)
PLATELET # BLD AUTO: 315 K/UL (ref 150–450)
PMV BLD AUTO: 10.2 FL (ref 9.4–12.3)
POTASSIUM SERPL-SCNC: 4.5 MMOL/L (ref 3.5–5.1)
PROT SERPL-MCNC: 7.3 G/DL (ref 6.3–8.2)
PROT UR STRIP-MCNC: NEGATIVE MG/DL
RBC # BLD AUTO: 4.79 M/UL (ref 4.05–5.2)
SODIUM SERPL-SCNC: 138 MMOL/L (ref 136–145)
SP GR UR REFRACTOMETRY: 1.02 (ref 1–1.02)
T4 FREE SERPL-MCNC: 1 NG/DL (ref 0.9–1.8)
TRIGL SERPL-MCNC: 119 MG/DL (ref 35–150)
TSH, 3RD GENERATION: 0.9 UIU/ML (ref 0.36–3.74)
UROBILINOGEN UR QL STRIP.AUTO: 0.2 EU/DL (ref 0.2–1)
VLDLC SERPL CALC-MCNC: 23.8 MG/DL (ref 6–23)
WBC # BLD AUTO: 5.9 K/UL (ref 4.3–11.1)

## 2022-06-29 ENCOUNTER — OFFICE VISIT (OUTPATIENT)
Dept: INTERNAL MEDICINE CLINIC | Facility: CLINIC | Age: 52
End: 2022-06-29
Payer: COMMERCIAL

## 2022-06-29 VITALS
BODY MASS INDEX: 26.36 KG/M2 | WEIGHT: 164 LBS | HEIGHT: 66 IN | TEMPERATURE: 97.2 F | OXYGEN SATURATION: 98 % | DIASTOLIC BLOOD PRESSURE: 70 MMHG | HEART RATE: 83 BPM | SYSTOLIC BLOOD PRESSURE: 120 MMHG

## 2022-06-29 DIAGNOSIS — R73.03 PREDIABETES: ICD-10-CM

## 2022-06-29 DIAGNOSIS — Z00.00 PHYSICAL EXAM: Primary | ICD-10-CM

## 2022-06-29 DIAGNOSIS — R51.9 LEFT TEMPORAL HEADACHE: ICD-10-CM

## 2022-06-29 DIAGNOSIS — E78.2 MIXED HYPERLIPIDEMIA: ICD-10-CM

## 2022-06-29 PROCEDURE — 99396 PREV VISIT EST AGE 40-64: CPT | Performed by: NURSE PRACTITIONER

## 2022-06-29 RX ORDER — ZINC GLUCONATE 50 MG
50 TABLET ORAL DAILY
COMMUNITY

## 2022-06-29 RX ORDER — BACILLUS COAGULANS/INULIN 1B-250 MG
CAPSULE ORAL
COMMUNITY

## 2022-06-29 RX ORDER — MULTIVIT WITH MINERALS/LUTEIN
500 TABLET ORAL DAILY
COMMUNITY

## 2022-06-29 ASSESSMENT — ENCOUNTER SYMPTOMS
SINUS PAIN: 0
NAUSEA: 0
EYE PAIN: 0
APNEA: 0
BACK PAIN: 0
ABDOMINAL DISTENTION: 0
DIARRHEA: 0
CONSTIPATION: 0
EYE ITCHING: 0
EYE REDNESS: 0
SHORTNESS OF BREATH: 0
COLOR CHANGE: 0
ABDOMINAL PAIN: 0
COUGH: 0
EYE DISCHARGE: 0

## 2022-06-29 ASSESSMENT — PATIENT HEALTH QUESTIONNAIRE - PHQ9
2. FEELING DOWN, DEPRESSED OR HOPELESS: 0
SUM OF ALL RESPONSES TO PHQ9 QUESTIONS 1 & 2: 0
SUM OF ALL RESPONSES TO PHQ QUESTIONS 1-9: 0
1. LITTLE INTEREST OR PLEASURE IN DOING THINGS: 0
SUM OF ALL RESPONSES TO PHQ QUESTIONS 1-9: 0

## 2022-06-29 NOTE — PROGRESS NOTES
DOMINGO    Afshan Jorgensen is a 46 y.o. female seen for annual GYN exam.  She has a history of stage 1 ovarian cancer for which she had the ? ovary removed and had 12 months of chemo. She says her left ovary was removed but chart indicates the right ovary was removed. She has been having some pain in the RLQ. Past Medical History, Past Surgical History, Family history, Social History, and Medications were all reviewed with the patient today and updated as necessary. Current Outpatient Medications   Medication Sig    UNABLE TO FIND ProFema Menopause Multi  Transitions Menopause Support    Bacillus Coagulans-Inulin (PROBIOTIC) 1-250 BILLION-MG CAPS Take by mouth    Ascorbic Acid (VITAMIN C) 250 MG tablet Take 500 mg by mouth daily    zinc gluconate 50 MG tablet Take 50 mg by mouth daily    Biotin 2.5 MG CAPS Take by mouth    Calcium Carbonate-Vitamin D (CALCIUM-VITAMIN D) 600-125 MG-UNIT TABS Take by mouth    Cholecalciferol 50 MCG (2000 UT) TABS Take by mouth    cyanocobalamin 100 MCG tablet Take 100 mcg by mouth daily    methylPREDNISolone (MEDROL DOSEPACK) 4 MG tablet Take as directed. (Patient not taking: Reported on 6/29/2022)     No current facility-administered medications for this visit. Allergies   Allergen Reactions    Latex Anaphylaxis    Penicillins Swelling     Was given it in 1990 during surgery and told by the medical staff that she is allergic. She was unsure of the reaction she had to the drug.     Papaya Rash     Past Medical History:   Diagnosis Date    Anxiety     Burning with urination     history-resolved     Headache     History of mammogram 07/2020    History of Papanicolaou smear of cervix 2020    Dr. Susan Larson Hx of seasonal allergies     Ovarian cancer ([facility] [address] 1990    R oophorectomy-Per Pt Stage I and had chemotherapy for 12 months     Past Surgical History:   Procedure Laterality Date    APPENDECTOMY  1990    BREAST BIOPSY Left     Benign per pt    COLONOSCOPY N/A 4/15/2019    COLONOSCOPY/ 28 performed by Jessica Alvarado MD at [address] Left ,     OVARY REMOVAL Right      Family History   Problem Relation Age of Onset    Breast Cancer Neg Hx     Osteoarthritis Mother     Diabetes Father     Stroke Paternal Uncle     Diabetes Paternal Uncle     Heart Disease Father     Stomach Cancer Paternal Uncle       Social History     Tobacco Use    Smoking status: Never Smoker    Smokeless tobacco: Never Used   Substance Use Topics    Alcohol use: Yes       Social History     Substance and Sexual Activity   Sexual Activity Not Currently     OB History    Para Term  AB Living   0 0 0 0 0 0   SAB IAB Ectopic Molar Multiple Live Births   0 0 0 0 0 0       Health Maintenance  Mammogram: 19  Colonoscopy: 4/15/2019  Bone Density:  Pap smear:6/3/21      Review of Systems  General: Not Present- Chills, Fever, Fatigue, Insomnia, Hot flashes/Night sweats, Weight gain  Skin: Not Present- Bruising, Change in Wart/Mole, Excessive Sweating, Itching, Nail Changes, New Lesions, Rash, Skin Color Changes and Ulcer. HEENT: Not Present- Headache, Blurred Vision, Double Vision, Glaucoma, Visual Disturbances, Hearing Loss, Ringing in the Ears, Vertigo, Nose Bleed, Bleeding Gums, Hoarseness and Sore Throat. Neck: Not Present- Neck Pain and Neck Swelling. Respiratory: Not Present- Cough, Difficulty Breathing and Difficulty Breathing on Exertion. Breast: Not Present- Breast Mass, Breast Pain, Breast Swelling, Nipple Discharge, Nipple Pain, Recent Breast Size Changes and Skin Changes. Cardiovascular: Not Present- Abnormal Blood Pressure, Chest Pain, Edema, Fainting / Blacking Out, Palpitations, Shortness of Breath and Swelling of Extremities.   Gastrointestinal: Not Present- Abdominal Pain, Abdominal Swelling, Bloating, Change in Bowel Habits, Constipation, Diarrhea, Difficulty Swallowing, Gets full quickly at meals, Nausea, Rectal Bleeding and Vomiting. Female Genitourinary: Not Present- Dysmenorrhea, Dyspareunia, Decreased libido, Excessive Menstrual Bleeding, Menstrual Irregularities, Pelvic Pain, Urinary Complaints, Vaginal Discharge, Vaginal itching/burning, Vaginal odor  Musculoskeletal: Not Present- Joint Pain and Muscle Pain. Neurological: Not Present- Dizziness, Fainting, Headaches and Seizures. Psychiatric: Not Present- Anxiety, Depression, Mood changes and Panic Attacks. Endocrine: Not Present- Appetite Changes, Cold Intolerance, Excessive Thirst, Excessive Urination and Heat Intolerance. Hematology: Not Present- Abnormal Bleeding, Easy Bruising and Enlarged Lymph Nodes. PHYSICAL EXAM:     /78 (Position: Sitting)   Ht 5' 5.5\" (1.664 m)   Wt 163 lb (73.9 kg)   BMI 26.71 kg/m²     Physical Exam   General   Mental Status - Alert. General Appearance - Cooperative. Integumentary   General Characteristics: Overall examination of the patient's skin reveals - no rashes and no suspicious lesions. Head and Neck  Head - normocephalic, atraumatic with no lesions or palpable masses. Neck Note: Normal   Thyroid   Gland Characteristics - normal size and consistency and no palpable nodules. Chest and Lung Exam   Chest and lung exam reveals - on auscultation, normal breath sounds, no adventitious sounds and normal vocal resonance. Breast   Breast - Left - Normal. Right - Normal.     Cardiovascular   Cardiovascular examination reveals - normal heart sounds, regular rate and rhythm with no murmurs. Abdomen   Inspection: - Inspection Normal.   Palpation/Percussion: Palpation and Percussion of the abdomen reveal - Non Tender, No Rebound tenderness, No Rigidity (guarding), No hepatosplenomegaly, No Palpable abdominal masses and Soft.    Auscultation: Auscultation of the abdomen reveals - Bowel sounds normal.     Female Genitourinary     External Genitalia   Vulva: - Normal. Perineum - Normal. Bartholin's Gland - Bilateral - Normal. Clitoris - Normal.   Introitus: Characteristics - Normal.   Urethra: Characteristics - Normal.     Speculum & Bimanual   Vagina: Vaginal Mucosa - Normal.   Vaginal Wall: - Normal.   Vaginal Lesions - None. Cervix: Characteristics - Normal.   Uterus: Characteristics - Normal.   Adnexa: - Normal.   Bladder - Normal.     Peripheral Vascular   Normal    Neuropsychiatric   Examination of related systems reveals - The patient is well-nourished and well-groomed. Mental status exam performed with findings of - Oriented X3 with appropriate mood and affect. Musculoskeletal  Normal      General Lymphatics  Normal           Medical problems and test results were reviewed with the patient today. ASSESSMENT and PLAN    1. Encounter for well woman exam with routine gynecological exam  2. Routine cervical smear  -     PAP LB, Reflex HPV ASCUS  3. History of ovarian cancer  -     Cancer Antigen 125; Future  4. Pelvic pain   Schedule US for pelvic pain with hx of ovarian cancer. Return for AE made for 2023.        Keira Chau MD  6/30/2022

## 2022-06-29 NOTE — PROGRESS NOTES
[unfilled]  [address] [place] [id number]      PROGRESS NOTE    SUBJECTIVE:   Uvaldo Bernstein is a 46 y.o. female seen for a follow up visit regarding the following chief complaint:     Chief Complaint   Patient presents with    Annual Exam    Cholesterol Problem       HPI  Patient presents for CPE. Labs reviewed and discussed in detail. Skin concerns-denies any concerns. Vision screen-planning follow up 10/22. Vaccines UTD;  Colon cancer screening UTD  with Dr. Deric Eller. PAP due-appt tomorrow for Gynecology. IF, A1c 6%; Stable with diet control and lifestyle modification. HL: stable with lifestyle modification. Headaches: intermittent on temporal area a few times monthly. She states she will sleep if off and this resolves symptoms. She did recover from covid 2-3 weeks ago; Denies any weakness, numbness, double vision; she does not have a headache today. Sleeping well, denies change in stress or diet pattern. She is planning to discuss this with her gynecologist tomorrow due to thinking it is post menopausal associated as she is having some hot flashes too. Current Outpatient Medications   Medication Sig Dispense Refill    Bacillus Coagulans-Inulin (PROBIOTIC) 1-250 BILLION-MG CAPS Take by mouth      Ascorbic Acid (VITAMIN C) 250 MG tablet Take 500 mg by mouth daily      zinc gluconate 50 MG tablet Take 50 mg by mouth daily      Biotin 2.5 MG CAPS Take by mouth      Calcium Carbonate-Vitamin D (CALCIUM-VITAMIN D) 600-125 MG-UNIT TABS Take by mouth      Cholecalciferol 50 MCG (2000) TABS Take by mouth      cyanocobalamin 100 MCG tablet Take 100 mcg by mouth daily      methylPREDNISolone (MEDROL DOSEPACK) 4 MG tablet Take as directed. (Patient not taking: Reported on 2022)       No current facility-administered medications for this visit.      Allergies   Allergen Reactions    Latex Anaphylaxis    Penicillins Swelling     Was given it in 1990 during surgery and told by the medical staff that she is allergic. She was unsure of the reaction she had to the drug.  Papaya Rash       Past Medical History:   Diagnosis Date    Anxiety     Burning with urination     history-resolved     Headache     History of mammogram 07/2020    History of Papanicolaou smear of cervix 2020    Dr. James Marx Hx of seasonal allergies     Ovarian cancer ([facility] [address] 1990    R oophorectomy-Per Pt Stage I and had chemotherapy for 12 months     Past Surgical History:   Procedure Laterality Date    APPENDECTOMY  1990    BREAST BIOPSY Left     Benign per pt    COLONOSCOPY N/A 4/15/2019    COLONOSCOPY/ 28 performed by Phyllis Arita MD at [facility] Left 1993, 1999    OVARY REMOVAL Right 1990     Family History   Problem Relation Age of Onset    Breast Cancer Neg Hx     Osteoarthritis Mother     Diabetes Father     Stroke Paternal Uncle     Diabetes Paternal Uncle     Heart Disease Father     Stomach Cancer Paternal Uncle      Social History     Tobacco Use    Smoking status: Never Smoker    Smokeless tobacco: Never Used   Substance Use Topics    Alcohol use: Yes       Review of Systems   Constitutional: Negative for activity change, appetite change, chills, fatigue and fever. HENT: Negative for congestion, ear pain and sinus pain. Eyes: Negative for pain, discharge, redness and itching. Respiratory: Negative for apnea, cough and shortness of breath. Cardiovascular: Negative for chest pain, palpitations and leg swelling. Gastrointestinal: Negative for abdominal distention, abdominal pain, constipation, diarrhea and nausea. Endocrine: Negative for cold intolerance and heat intolerance. Genitourinary: Negative for difficulty urinating, dysuria, enuresis and urgency. Musculoskeletal: Negative for arthralgias, back pain, joint swelling, myalgias and neck pain. Skin: Negative for color change and rash. Neurological: Negative for dizziness, weakness and headaches (intermittent). Psychiatric/Behavioral: Negative for behavioral problems and sleep disturbance. The patient is not nervous/anxious. OBJECTIVE:  /70 (Site: Left Upper Arm, Position: Sitting, Cuff Size: Large Adult)   Pulse 83   Temp 97.2 °F (36.2 °C) (Temporal)   Ht 5' 5.5\" (1.664 m)   Wt 164 lb (74.4 kg)   SpO2 98%   BMI 26.88 kg/m²      Physical Exam  Constitutional:       General: She is not in acute distress. Appearance: Normal appearance. She is not ill-appearing. HENT:      Head: Normocephalic. Right Ear: Tympanic membrane normal.      Left Ear: Tympanic membrane normal.      Nose: No congestion. Mouth/Throat:      Mouth: Mucous membranes are moist.      Pharynx: Oropharynx is clear. No posterior oropharyngeal erythema. Eyes:      Conjunctiva/sclera: Conjunctivae normal.      Pupils: Pupils are equal, round, and reactive to light. Cardiovascular:      Rate and Rhythm: Normal rate and regular rhythm. Heart sounds: No murmur heard. Pulmonary:      Effort: Pulmonary effort is normal. No respiratory distress. Breath sounds: Normal breath sounds. Abdominal:      General: Abdomen is flat. Palpations: Abdomen is soft. Tenderness: There is no abdominal tenderness. Musculoskeletal:         General: Normal range of motion. Cervical back: Normal range of motion. Skin:     General: Skin is warm and dry. Findings: No rash. Neurological:      General: No focal deficit present. Mental Status: She is alert and oriented to person, place, and time. Mental status is at baseline. Psychiatric:         Mood and Affect: Mood normal.         Behavior: Behavior normal.         Thought Content: Thought content normal.           ASSESSMENT and PLAN  Kaitlin Fernandez was seen today for annual exam and cholesterol problem.     Diagnoses and all orders for this visit:    Physical exam    Mixed hyperlipidemia    Prediabetes    Left temporal headache  Comments:  intermittent    Annual Exam: Goals for good health were addressed at today's visit:  -Reach and stay at a healthy weight. This can lower your risk of obesity, diabetes, heart disease and high blood pressure. -Get at least 30 minutes of physical activity daily. Walking, running, swimming, cycling or playing sports are good options.  -Do not smoke or allow others to smoke around you. -Use birth control if you do not want to have children at this time and barrier contraception to prevent or decrease risk of exposure to sexually transmitted diseases. -Use sunscreen daily, SPF of 15 or higher are best.  -See a dentist at least once yearly for checkups. -Have vision checked every 1-2 years.  -Wear a seat belt in the car.  -Avoid drinking in excess of 2 alcoholic drinks per day for men and 1 drink a day for women, or avoid drinking altogether.   -Watch closely for changes in our health and contact your doctor with any concerning problems or symptoms  -Age appropriate screening tests were updated and discussed at today's visit. -Immunization history was reviewed and updated at today's visit. Headache: may try advil or tylenol if rest does not resolve headache; should HA be persistent in spite of medication to return to office for recheck. Follow with gynecology as planned. HL/prediabetes: stable with diet/lifestyle; encouraged to continue low fat/low carb diet with regular exercise and goal normal BMI. Follow-up and Dispositions    · Return in about 1 year (around 6/29/2023) for labs/PE.          Adonay Bradshaw NP, APRN - CNP

## 2022-06-30 ENCOUNTER — OFFICE VISIT (OUTPATIENT)
Dept: GYNECOLOGY | Age: 52
End: 2022-06-30
Payer: COMMERCIAL

## 2022-06-30 VITALS
WEIGHT: 163 LBS | SYSTOLIC BLOOD PRESSURE: 124 MMHG | HEIGHT: 66 IN | BODY MASS INDEX: 26.2 KG/M2 | DIASTOLIC BLOOD PRESSURE: 78 MMHG

## 2022-06-30 DIAGNOSIS — R10.2 PELVIC PAIN: ICD-10-CM

## 2022-06-30 DIAGNOSIS — Z01.419 ENCOUNTER FOR WELL WOMAN EXAM WITH ROUTINE GYNECOLOGICAL EXAM: Primary | ICD-10-CM

## 2022-06-30 DIAGNOSIS — Z12.4 ROUTINE CERVICAL SMEAR: ICD-10-CM

## 2022-06-30 DIAGNOSIS — Z85.43 HISTORY OF OVARIAN CANCER: ICD-10-CM

## 2022-06-30 PROCEDURE — 99396 PREV VISIT EST AGE 40-64: CPT | Performed by: OBSTETRICS & GYNECOLOGY

## 2022-07-01 LAB — CANCER AG125 SERPL-ACNC: 6 U/ML (ref 1.5–35)

## 2022-07-07 LAB
CYTOLOGIST CVX/VAG CYTO: NORMAL
CYTOLOGY CVX/VAG DOC THIN PREP: NORMAL
HPV REFLEX: NORMAL
Lab: NORMAL
Lab: NORMAL
PATH REPORT.FINAL DX SPEC: NORMAL
STAT OF ADQ CVX/VAG CYTO-IMP: NORMAL

## 2022-07-19 ENCOUNTER — HOSPITAL ENCOUNTER (OUTPATIENT)
Dept: MAMMOGRAPHY | Age: 52
Discharge: HOME | End: 2022-07-22
Payer: COMMERCIAL

## 2022-07-19 DIAGNOSIS — Z12.31 VISIT FOR SCREENING MAMMOGRAM: ICD-10-CM

## 2022-07-19 PROCEDURE — 77063 BREAST TOMOSYNTHESIS BI: CPT

## 2022-07-21 ENCOUNTER — COMMUNITY OUTREACH (OUTPATIENT)
Dept: INTERNAL MEDICINE CLINIC | Facility: CLINIC | Age: 52
End: 2022-07-21

## 2022-07-21 NOTE — PROGRESS NOTES
Patient's HM shows they are overdue for Colorectal Screening. Care Everywhere and  files searched.   HM was updated w/ Colonoscopy from 2019

## 2022-07-28 ENCOUNTER — APPOINTMENT (OUTPATIENT)
Dept: MAMMOGRAPHY | Age: 52
End: 2022-07-28
Payer: COMMERCIAL

## 2022-07-28 ENCOUNTER — HOSPITAL ENCOUNTER (OUTPATIENT)
Dept: MAMMOGRAPHY | Age: 52
Discharge: HOME | End: 2022-07-31
Payer: COMMERCIAL

## 2022-07-28 DIAGNOSIS — R92.8 ABNORMAL SCREENING MAMMOGRAM: ICD-10-CM

## 2022-07-28 PROCEDURE — 77065 DX MAMMO INCL CAD UNI: CPT

## 2022-07-28 PROCEDURE — 76642 ULTRASOUND BREAST LIMITED: CPT

## 2022-08-02 NOTE — PROGRESS NOTES
DOMINGO Minaya is a 45 y.o. female seen for follow up RLQ pain with a hx of stage 1 ovarian cancer. She had US today. She also is due for a repeat PAP due to unsatisfactory PAP at her last visit. Past Medical History, Past Surgical History, Family history, Social History, and Medications were all reviewed with the patient today and updated as necessary. Current Outpatient Medications   Medication Sig    UNABLE TO FIND ProFema Menopause Multi  Transitions Menopause Support    Bacillus Coagulans-Inulin (PROBIOTIC) 1-250 BILLION-MG CAPS Take by mouth    Ascorbic Acid (VITAMIN C) 250 MG tablet Take 500 mg by mouth daily    zinc gluconate 50 MG tablet Take 50 mg by mouth daily    Biotin 2.5 MG CAPS Take by mouth    Calcium Carbonate-Vitamin D (CALCIUM-VITAMIN D) 600-125 MG-UNIT TABS Take by mouth    Cholecalciferol 50 MCG (2000 UT) TABS Take by mouth    cyanocobalamin 100 MCG tablet Take 100 mcg by mouth daily    methylPREDNISolone (MEDROL DOSEPACK) 4 MG tablet Take as directed. No current facility-administered medications for this visit. Allergies   Allergen Reactions    Latex Anaphylaxis    Penicillins Swelling     Was given it in 1990 during surgery and told by the medical staff that she is allergic. She was unsure of the reaction she had to the drug.     Papaya Rash     Past Medical History:   Diagnosis Date    Anxiety     Burning with urination     history-resolved     Headache     History of mammogram 07/2020    History of Papanicolaou smear of cervix 2020    Dr. Bia Mancini    Hx of seasonal allergies     Ovarian cancer ([facility] [address] 1990    R oophorectomy-Per Pt Stage I and had chemotherapy for 12 months     Past Surgical History:   Procedure Laterality Date    APPENDECTOMY  1990    BREAST BIOPSY Left     Benign per pt    COLONOSCOPY N/A 4/15/2019    COLONOSCOPY/ 28 performed by Rico Minaya MD at [address], [address] [place] [address] Family History   Problem Relation Age of Onset    Breast Cancer Neg Hx     Osteoarthritis Mother     Diabetes Father     Stroke Paternal Uncle     Diabetes Paternal Uncle     Heart Disease Father     Stomach Cancer Paternal Uncle       Social History     Tobacco Use    Smoking status: Never    Smokeless tobacco: Never   Substance Use Topics    Alcohol use: Yes       Social History     Substance and Sexual Activity   Sexual Activity Not Currently     OB History    Para Term  AB Living   0 0 0 0 0 0   SAB IAB Ectopic Molar Multiple Live Births   0 0 0 0 0 0       Health Maintenance  Mammogram:   Colonoscopy:   Bone Density:    ROS:    Review of Systems  General: Not Present- Chills, Fever, Fatigue, Insomnia, Hot flashes/Night sweats, Weight gain  Skin: Not Present- Bruising, Change in Wart/Mole, Excessive Sweating, Itching, Nail Changes, New Lesions, Rash, Skin Color Changes and Ulcer. HEENT: Not Present- Headache, Blurred Vision, Double Vision, Glaucoma, Visual Disturbances, Hearing Loss, Ringing in the Ears, Vertigo, Nose Bleed, Bleeding Gums, Hoarseness and Sore Throat. Neck: Not Present- Neck Pain and Neck Swelling. Respiratory: Not Present- Cough, Difficulty Breathing and Difficulty Breathing on Exertion. Breast: Not Present- Breast Mass, Breast Pain, Breast Swelling, Nipple Discharge, Nipple Pain, Recent Breast Size Changes and Skin Changes. Cardiovascular: Not Present- Abnormal Blood Pressure, Chest Pain, Edema, Fainting / Blacking Out, Palpitations, Shortness of Breath and Swelling of Extremities. Gastrointestinal: Not Present- Abdominal Pain, Abdominal Swelling, Bloating, Change in Bowel Habits, Constipation, Diarrhea, Difficulty Swallowing, Gets full quickly at meals, Nausea, Rectal Bleeding and Vomiting.   Female Genitourinary: Not Present- Dysmenorrhea, Dyspareunia, Decreased libido, Excessive Menstrual Bleeding, Menstrual Irregularities, Pelvic Pain, Urinary Complaints, Vaginal COMPARISON: Ultrasound 5/1/2019---normal   Uterus appears normal   Endo = 1.3mm and appears normal   Rt ovy adnexa appears normal   Lt ovy adnexa appears normal   History of one ovary removed due to cancer. Patient states it is the left. No free fluid seen   Date: 08/03/2022 Perf. Physician: Dr. Trisha Muñoz MD Sonographer: Carmen Dawkins, [address] [place] [address] done in my office and discussed with patient. Reassured nothing seen on US to account for her pain. Discussed possible adhesions. She does not think the pain is bad enough to warrant any further treatment. Time:  I spent  30  minutes in preparing to see patient (including chart review and preparation), obtaining and/or reviewing additional medical history, performing a physical exam and evaluation, documenting clinical information in the electronic health record, independently interpreting results, communicating results to patient, family or caregiver, and/or coordinating care. No follow-ups on file.        Luna Esqueda MD

## 2022-08-03 ENCOUNTER — OFFICE VISIT (OUTPATIENT)
Dept: GYNECOLOGY | Age: 52
End: 2022-08-03
Payer: COMMERCIAL

## 2022-08-03 VITALS
SYSTOLIC BLOOD PRESSURE: 126 MMHG | DIASTOLIC BLOOD PRESSURE: 82 MMHG | HEIGHT: 66 IN | BODY MASS INDEX: 26.84 KG/M2 | WEIGHT: 167 LBS

## 2022-08-03 DIAGNOSIS — R10.2 PELVIC PAIN: Primary | ICD-10-CM

## 2022-08-03 DIAGNOSIS — R87.615 PAP SMEAR OF CERVIX UNSATISFACTORY: ICD-10-CM

## 2022-08-03 DIAGNOSIS — Z85.43 HISTORY OF OVARIAN CANCER: ICD-10-CM

## 2022-08-03 PROCEDURE — 76830 TRANSVAGINAL US NON-OB: CPT | Performed by: OBSTETRICS & GYNECOLOGY

## 2022-08-03 PROCEDURE — 99214 OFFICE O/P EST MOD 30 MIN: CPT | Performed by: OBSTETRICS & GYNECOLOGY

## 2022-08-04 LAB
CYTOLOGIST CVX/VAG CYTO: NORMAL
CYTOLOGY CVX/VAG DOC THIN PREP: NORMAL
HPV REFLEX: NORMAL
Lab: NORMAL
PATH REPORT.FINAL DX SPEC: NORMAL
STAT OF ADQ CVX/VAG CYTO-IMP: NORMAL

## 2022-09-12 ENCOUNTER — HOSPITAL ENCOUNTER (EMERGENCY)
Age: 52
Discharge: HOME | End: 2022-09-12
Attending: EMERGENCY MEDICINE
Payer: COMMERCIAL

## 2022-09-12 ENCOUNTER — HOSPITAL ENCOUNTER (EMERGENCY)
Dept: CT IMAGING | Age: 52
Discharge: HOME | End: 2022-09-15
Payer: COMMERCIAL

## 2022-09-12 VITALS
BODY MASS INDEX: 26.36 KG/M2 | OXYGEN SATURATION: 97 % | RESPIRATION RATE: 17 BRPM | DIASTOLIC BLOOD PRESSURE: 72 MMHG | WEIGHT: 164 LBS | HEART RATE: 67 BPM | SYSTOLIC BLOOD PRESSURE: 121 MMHG | HEIGHT: 66 IN | TEMPERATURE: 98.4 F

## 2022-09-12 DIAGNOSIS — R20.2 TINGLING: ICD-10-CM

## 2022-09-12 DIAGNOSIS — R42 DIZZINESS: Primary | ICD-10-CM

## 2022-09-12 LAB
ALBUMIN SERPL-MCNC: 3.8 G/DL (ref 3.5–5)
ALBUMIN/GLOB SERPL: 1 {RATIO} (ref 1.2–3.5)
ALP SERPL-CCNC: 91 U/L (ref 50–136)
ALT SERPL-CCNC: 29 U/L (ref 12–65)
ANION GAP SERPL CALC-SCNC: 4 MMOL/L (ref 4–13)
AST SERPL-CCNC: 23 U/L (ref 15–37)
BASOPHILS # BLD: 0.1 K/UL (ref 0–0.2)
BASOPHILS NFR BLD: 1 % (ref 0–2)
BILIRUB SERPL-MCNC: 0.4 MG/DL (ref 0.2–1.1)
BUN SERPL-MCNC: 13 MG/DL (ref 6–23)
CALCIUM SERPL-MCNC: 9.1 MG/DL (ref 8.3–10.4)
CHLORIDE SERPL-SCNC: 105 MMOL/L (ref 101–110)
CO2 SERPL-SCNC: 30 MMOL/L (ref 21–32)
CREAT SERPL-MCNC: 0.65 MG/DL (ref 0.6–1)
DIFFERENTIAL METHOD BLD: NORMAL
EOSINOPHIL # BLD: 0.1 K/UL (ref 0–0.8)
EOSINOPHIL NFR BLD: 1 % (ref 0.5–7.8)
ERYTHROCYTE [DISTWIDTH] IN BLOOD BY AUTOMATED COUNT: 12.8 % (ref 11.9–14.6)
GLOBULIN SER CALC-MCNC: 3.8 G/DL (ref 2.3–3.5)
GLUCOSE SERPL-MCNC: 92 MG/DL (ref 65–100)
HCT VFR BLD AUTO: 38.4 % (ref 35.8–46.3)
HGB BLD-MCNC: 13.1 G/DL (ref 11.7–15.4)
IMM GRANULOCYTES # BLD AUTO: 0 K/UL (ref 0–0.5)
IMM GRANULOCYTES NFR BLD AUTO: 0 % (ref 0–5)
LYMPHOCYTES # BLD: 3.2 K/UL (ref 0.5–4.6)
LYMPHOCYTES NFR BLD: 40 % (ref 13–44)
MCH RBC QN AUTO: 28.7 PG (ref 26.1–32.9)
MCHC RBC AUTO-ENTMCNC: 34.1 G/DL (ref 31.4–35)
MCV RBC AUTO: 84.2 FL (ref 79.6–97.8)
MONOCYTES # BLD: 0.7 K/UL (ref 0.1–1.3)
MONOCYTES NFR BLD: 9 % (ref 4–12)
NEUTS SEG # BLD: 3.9 K/UL (ref 1.7–8.2)
NEUTS SEG NFR BLD: 49 % (ref 43–78)
NRBC # BLD: 0 K/UL (ref 0–0.2)
PLATELET # BLD AUTO: 305 K/UL (ref 150–450)
PMV BLD AUTO: 9.4 FL (ref 9.4–12.3)
POTASSIUM SERPL-SCNC: 3.8 MMOL/L (ref 3.5–5.1)
PROT SERPL-MCNC: 7.6 G/DL (ref 6.3–8.2)
RBC # BLD AUTO: 4.56 M/UL (ref 4.05–5.2)
SODIUM SERPL-SCNC: 139 MMOL/L (ref 136–145)
TSH, 3RD GENERATION: 1.47 UIU/ML (ref 0.36–3.74)
WBC # BLD AUTO: 8 K/UL (ref 4.3–11.1)

## 2022-09-12 PROCEDURE — 85025 COMPLETE CBC W/AUTO DIFF WBC: CPT

## 2022-09-12 PROCEDURE — 70450 CT HEAD/BRAIN W/O DYE: CPT

## 2022-09-12 PROCEDURE — 80053 COMPREHEN METABOLIC PANEL: CPT

## 2022-09-12 PROCEDURE — 2580000003 HC RX 258: Performed by: EMERGENCY MEDICINE

## 2022-09-12 PROCEDURE — 84443 ASSAY THYROID STIM HORMONE: CPT

## 2022-09-12 PROCEDURE — 99284 EMERGENCY DEPT VISIT MOD MDM: CPT

## 2022-09-12 RX ORDER — 0.9 % SODIUM CHLORIDE 0.9 %
1000 INTRAVENOUS SOLUTION INTRAVENOUS
Status: COMPLETED | OUTPATIENT
Start: 2022-09-12 | End: 2022-09-12

## 2022-09-12 RX ADMIN — SODIUM CHLORIDE 1000 ML: 9 INJECTION, SOLUTION INTRAVENOUS at 21:11

## 2022-09-12 ASSESSMENT — ENCOUNTER SYMPTOMS
COUGH: 0
COLOR CHANGE: 0
DIARRHEA: 0
SHORTNESS OF BREATH: 0
VOMITING: 0
RHINORRHEA: 0
SORE THROAT: 0
BACK PAIN: 0
CONSTIPATION: 0
ABDOMINAL PAIN: 0
VISUAL CHANGE: 0
NAUSEA: 0

## 2022-09-12 ASSESSMENT — PAIN SCALES - GENERAL: PAINLEVEL_OUTOF10: 0

## 2022-09-12 ASSESSMENT — PAIN - FUNCTIONAL ASSESSMENT: PAIN_FUNCTIONAL_ASSESSMENT: 0-10

## 2022-09-12 NOTE — ED TRIAGE NOTES
Pt states she has been having headaches for a few weeks now that sometimes make her chest hurt. Pt also reports tingling in her fingers, toes, and tongue. Pt states the tingling is like pins and needles. Pt states she has also been having some episodes of dizziness.

## 2022-09-13 ENCOUNTER — TELEPHONE (OUTPATIENT)
Dept: INTERNAL MEDICINE CLINIC | Facility: CLINIC | Age: 52
End: 2022-09-13

## 2022-09-13 NOTE — ED NOTES
I have reviewed discharge instructions with the patient/spouse. The patient and spouse verbalized understanding. Patient left ED via Discharge Method: ambulatory to Home with family. Opportunity for questions and clarification provided. Patient given 0 scripts. To continue your aftercare when you leave the hospital, you may receive an automated call from our care team to check in on how you are doing. This is a free service and part of our promise to provide the best care and service to meet your aftercare needs.  If you have questions, or wish to unsubscribe from this service please call [phone]. Thank you for Choosing our [facility] Emergency Department.           Aliya Morales RN  09/12/22 1472 Telephone Encounter by Dinora Mccarthy CMA at 07/10/17 09:24 AM     Author:  Dinora Mccarthy CMA Service:  (none) Author Type:  Certified Medical Assistant     Filed:  07/10/17 09:24 AM Encounter Date:  6/2/2017 Status:  Signed     :  Dinora Mccarthy CMA (Certified Medical Assistant)            Patient picked up orthotics at [facility] visit on[JT1.1T] 7/10/2017[JT1.1M]    Closing encounter.[JT1.1T]         Revision History        User Key Date/Time User Provider Type Action    > JT1.1 07/10/17 09:24 AM Dinora Mccarthy CMA Certified Medical Assistant Sign    M - Manual, T - Template

## 2022-09-13 NOTE — ED PROVIDER NOTES
Emergency Department Provider Note                   PCP:                Mishel Lopez MD               Age: 45 y.o. Sex: female       ICD-10-CM    1. Dizziness  R42       2. Tingling  R20.2           DISPOSITION Decision To Discharge 09/12/2022 11:13:48 PM       MDM  Number of Diagnoses or Management Options  Dizziness  Tingling  Diagnosis management comments: Patient with dizziness tingling and foggy headedness. No acute on CT or blood work. Will encourage increased fluid intake and follow-up with PCP. Amount and/or Complexity of Data Reviewed  Clinical lab tests: ordered and reviewed  Tests in the radiology section of CPT®: ordered and reviewed  Tests in the medicine section of CPT®: ordered and reviewed    Patient Progress  Patient progress: stable       Orders Placed This Encounter   Procedures    CT HEAD WO CONTRAST    CBC with Auto Differential    Comprehensive Metabolic Panel    TSH    Insert peripheral IV        Medications   0.9 % sodium chloride bolus (0 mLs IntraVENous Stopped 9/12/22 2141)       New Prescriptions    No medications on file        Sarai Alarcon is a 45 y.o. female who presents to the Emergency Department with chief complaint of    Chief Complaint   Patient presents with    Dizziness    Tingling      Patient with 2-week history of intermittent headache and foggy headedness. Today was worse with tingling to both hands and feet and around the mouth and frequent foggy headedness throughout the day. Brings her in for evaluation. No headache or blurry vision today. No chest pain or shortness of breath. No nausea vomiting diarrhea or constipation. No dysuria hematuria. The history is provided by the patient. No  was used.    Dizziness  Quality:  Unable to specify  Severity:  Mild  Duration:  2 weeks  Timing:  Intermittent  Progression:  Worsening  Chronicity:  New  Relieved by:  Nothing  Worsened by:  Nothing  Ineffective treatments:  None tried  Associated symptoms: headaches    Associated symptoms: no chest pain, no diarrhea, no nausea, no palpitations, no shortness of breath, no tinnitus, no vision changes, no vomiting and no weakness      All other systems reviewed and are negative unless otherwise stated in the history of present illness section. Review of Systems   Constitutional:  Negative for chills and fever. HENT:  Negative for rhinorrhea, sore throat and tinnitus. Respiratory:  Negative for cough and shortness of breath. Cardiovascular:  Negative for chest pain and palpitations. Gastrointestinal:  Negative for abdominal pain, constipation, diarrhea, nausea and vomiting. Genitourinary:  Negative for dysuria and hematuria. Musculoskeletal:  Negative for back pain and neck pain. Skin:  Negative for color change and rash. Neurological:  Positive for dizziness, light-headedness and headaches. Negative for facial asymmetry, speech difficulty, weakness and numbness. All other systems reviewed and are negative.     Past Medical History:   Diagnosis Date    Anxiety     Burning with urination     history-resolved     Headache     History of mammogram 07/2020    History of Papanicolaou smear of cervix 2020    Dr. Lindy Vo    Hx of seasonal allergies     Ovarian cancer ([facility] [address] 1990    R oophorectomy-Per Pt Stage I and had chemotherapy for 12 months        Past Surgical History:   Procedure Laterality Date    APPENDECTOMY  1990    BREAST BIOPSY Left     Benign per pt    COLONOSCOPY N/A 4/15/2019    COLONOSCOPY/ 28 performed by Dorita Montanez MD at [facility] Left 1993, 1999    OVARY REMOVAL Right 1990        Family History   Problem Relation Age of Onset    Breast Cancer Neg Hx     Osteoarthritis Mother     Diabetes Father     Stroke Paternal Uncle     Diabetes Paternal Uncle     Heart Disease Father     Stomach Cancer Paternal Uncle         Social History     Socioeconomic History    Marital status:      Spouse name: None    Number of children: None    Years of education: None    Highest education level: None   Tobacco Use    Smoking status: Never    Smokeless tobacco: Never   Vaping Use    Vaping Use: Never used   Substance and Sexual Activity    Alcohol use: Yes    Drug use: No    Sexual activity: Not Currently   Social History Narrative    Denies physical or sexual abuse        Allergies: Latex, Penicillins, and Papaya    Previous Medications    ASCORBIC ACID (VITAMIN C) 250 MG TABLET    Take 500 mg by mouth daily    BACILLUS COAGULANS-INULIN (PROBIOTIC) 1-250 BILLION-MG CAPS    Take by mouth    BIOTIN 2.5 MG CAPS    Take by mouth    CALCIUM CARBONATE-VITAMIN D (CALCIUM-VITAMIN D) 600-125 MG-UNIT TABS    Take by mouth    CHOLECALCIFEROL 50 MCG (2000 UT) TABS    Take by mouth    CYANOCOBALAMIN 100 MCG TABLET    Take 100 mcg by mouth daily    METHYLPREDNISOLONE (MEDROL DOSEPACK) 4 MG TABLET    Take as directed. UNABLE TO FIND    ProFema Menopause Multi  Transitions Menopause Support    ZINC GLUCONATE 50 MG TABLET    Take 50 mg by mouth daily        Vitals signs and nursing note reviewed. Patient Vitals for the past 4 hrs:   Temp Pulse Resp BP SpO2   09/12/22 2306 -- 67 17 121/72 97 %   09/12/22 1923 98.4 °F (36.9 °C) 75 18 122/84 97 %          Physical Exam  Vitals and nursing note reviewed. Constitutional:       General: She is not in acute distress. Appearance: Normal appearance. HENT:      Head: Normocephalic and atraumatic. Eyes:      Extraocular Movements: Extraocular movements intact. Pupils: Pupils are equal, round, and reactive to light. Cardiovascular:      Rate and Rhythm: Normal rate and regular rhythm. Pulmonary:      Effort: Pulmonary effort is normal.      Breath sounds: Normal breath sounds. No wheezing. Abdominal:      General: Bowel sounds are normal.      Palpations: Abdomen is soft. Tenderness: There is no abdominal tenderness.    Musculoskeletal: General: No swelling. Normal range of motion. Cervical back: Normal range of motion. No tenderness. Skin:     General: Skin is warm and dry. Neurological:      General: No focal deficit present. Mental Status: She is alert. Mental status is at baseline. Cranial Nerves: No cranial nerve deficit. Motor: No weakness.         Procedures      Results Include:    Recent Results (from the past 24 hour(s))   CBC with Auto Differential    Collection Time: 09/12/22  7:43 PM   Result Value Ref Range    WBC 8.0 4.3 - 11.1 K/uL    RBC 4.56 4.05 - 5.2 M/uL    Hemoglobin 13.1 11.7 - 15.4 g/dL    Hematocrit 38.4 35.8 - 46.3 %    MCV 84.2 79.6 - 97.8 FL    MCH 28.7 26.1 - 32.9 PG    MCHC 34.1 31.4 - 35.0 g/dL    RDW 12.8 11.9 - 14.6 %    Platelets 969 083 - 178 K/uL    MPV 9.4 9.4 - 12.3 FL    nRBC 0.00 0.0 - 0.2 K/uL    Differential Type AUTOMATED      Seg Neutrophils 49 43 - 78 %    Lymphocytes 40 13 - 44 %    Monocytes 9 4.0 - 12.0 %    Eosinophils % 1 0.5 - 7.8 %    Basophils 1 0.0 - 2.0 %    Immature Granulocytes 0 0.0 - 5.0 %    Segs Absolute 3.9 1.7 - 8.2 K/UL    Absolute Lymph # 3.2 0.5 - 4.6 K/UL    Absolute Mono # 0.7 0.1 - 1.3 K/UL    Absolute Eos # 0.1 0.0 - 0.8 K/UL    Basophils Absolute 0.1 0.0 - 0.2 K/UL    Absolute Immature Granulocyte 0.0 0.0 - 0.5 K/UL   Comprehensive Metabolic Panel    Collection Time: 09/12/22  7:43 PM   Result Value Ref Range    Sodium 139 136 - 145 mmol/L    Potassium 3.8 3.5 - 5.1 mmol/L    Chloride 105 101 - 110 mmol/L    CO2 30 21 - 32 mmol/L    Anion Gap 4 4 - 13 mmol/L    Glucose 92 65 - 100 mg/dL    BUN 13 6 - 23 MG/DL    Creatinine 0.65 0.6 - 1.0 MG/DL    GFR African American >60 >60 ml/min/1.73m2    GFR Non- >60 >60 ml/min/1.73m2    Calcium 9.1 8.3 - 10.4 MG/DL    Total Bilirubin 0.4 0.2 - 1.1 MG/DL    ALT 29 12 - 65 U/L    AST 23 15 - 37 U/L    Alk Phosphatase 91 50 - 136 U/L    Total Protein 7.6 6.3 - 8.2 g/dL    Albumin 3.8 3.5 - 5.0 g/dL    Globulin 3.8 (H) 2.3 - 3.5 g/dL    Albumin/Globulin Ratio 1.0 (L) 1.2 - 3.5     TSH    Collection Time: 09/12/22  7:43 PM   Result Value Ref Range    TSH, 3RD GENERATION 1.470 0.358 - 3.740 uIU/mL          CT HEAD WO CONTRAST   Final Result   1. No CT evidence of acute intracranial process. NIH Stroke Scale  Level of Consciousness (1a): Alert  LOC Questions (1b): Answers both correctly  LOC Commands (1c): Performs both tasks correctly  Best Gaze (2): Normal  Visual (3): No visual loss  Facial Palsy (4): (!) Minor paralysis (slight droop,  says is normal )  Motor Arm, Left (5a): No drift  Motor Arm, Right (5b): No drift  Motor Leg, Left (6a): No drift  Motor Leg, Right (6b): No drift  Limb Ataxia (7): Absent  Sensory (8): (!) Mild to Moderate (when touching the feet she said the left felt harder then the right )  Best Language (9): No aphasia  Dysarthria (10): Normal  Extinction and Inattention (11): No abnormality  Total: 2                   Voice dictation software was used during the making of this note. This software is not perfect and grammatical and other typographical errors may be present. This note has not been completely proofread for errors.      Marifer Stevens III, MD  09/12/22 4246

## 2022-09-13 NOTE — ED NOTES
Patient stated that she had tingling on her lips and in her fingers that started today. She also felt like she was \"abscent\" not fully alert today, not herself.  She has been having migraines for the last couple months, pain in her temples and sometimes in her chest.      Prateek Lee RN  09/12/22 2041

## 2022-09-16 ENCOUNTER — TELEMEDICINE (OUTPATIENT)
Dept: INTERNAL MEDICINE CLINIC | Facility: CLINIC | Age: 52
End: 2022-09-16
Payer: COMMERCIAL

## 2022-09-16 DIAGNOSIS — G44.021 INTRACTABLE CHRONIC CLUSTER HEADACHE: ICD-10-CM

## 2022-09-16 DIAGNOSIS — R20.2 PARESTHESIA: ICD-10-CM

## 2022-09-16 DIAGNOSIS — Z09 HOSPITAL DISCHARGE FOLLOW-UP: Primary | ICD-10-CM

## 2022-09-16 DIAGNOSIS — R42 DIZZINESS: ICD-10-CM

## 2022-09-16 PROCEDURE — 99214 OFFICE O/P EST MOD 30 MIN: CPT | Performed by: INTERNAL MEDICINE

## 2022-09-16 RX ORDER — MECLIZINE HCL 12.5 MG/1
12.5 TABLET ORAL 3 TIMES DAILY PRN
Qty: 30 TABLET | Refills: 0 | Status: SHIPPED | OUTPATIENT
Start: 2022-09-16 | End: 2022-09-26

## 2022-09-16 ASSESSMENT — ENCOUNTER SYMPTOMS: RESPIRATORY NEGATIVE: 1

## 2022-09-16 NOTE — PROGRESS NOTES
[facility]      2022    Patient Name: Tl Yip  :  1970    Subjective:    Chief Complaint:  Chief Complaint   Patient presents with    Follow-Up from Hospital         HPI I was at home while conducting this encounter. Consent:  She and/or her healthcare decision maker is aware that this patient-initiated Telehealth encounter is a billable service, with coverage as determined by her insurance carrier. She is aware that she may receive a bill and has provided verbal consent to proceed: Yes    This virtual visit was conducted via [address] Pursuant to the emergency declaration under the [facility], [facility] waiver authority and the [employer] Act, this Virtual  Visit was conducted to reduce the patient's risk of exposure to COVID-19 and provide continuity of care for an established patient. Services were provided through a video synchronous discussion virtually to substitute for in-person clinic visit. Due to this being a TeleHealth evaluation, many elements of the physical examination are unable to be assessed. Total Time: minutes: 11-20 minutes. Patient evaluated for hospital f/u, was seen in ED at [address] [place] [address] 22 for headaches that she's been having for several weeks and chest pain, dizziness, tingling in both hands and feet and around mouth;  CT head negative for acute intracranial process; adequate fluid intake, f/u w/ PCP recommended; records reviewed;    Dizziness has improved, has not had any today; she does have occasional pressure on the sides of her head that comes and goes; she scheduled appointment with Optho for next month; she had some numbness around her lips and her legs that comes and goes; she would like to see a Neurologist;     Past Medical History:   Diagnosis Date    Anxiety     Burning with urination     history-resolved     Headache     History of she had to the drug. Papaya Rash       Review of Systems   Constitutional: Negative. HENT: Negative. Respiratory: Negative. Neurological:  Positive for dizziness, numbness and headaches. Objective: There were no vitals taken for this visit. Examination:  Physical Exam  Neurological:      Mental Status: She is alert. Psychiatric:         Mood and Affect: Mood normal.         Thought Content: Thought content normal.         Judgment: Judgment normal.         Assessment/Plan:    Owen Centeno was seen today for follow-up from hospital.    Diagnoses and all orders for this visit:    Hospital discharge follow-up  Hospitalization records, including labs, imaging studies, and consults were reviewed with patient today, along with treatment plan and follow-up recommendations. Dizziness  Instructed to take medications as prescribed, and to call if no improvement in symptoms.     -     Bri Dominguez MD, Neurology, [facility]  -     meclizine (ANTIVERT) 12.5 MG tablet; Take 1 tablet by mouth 3 times daily as needed for Dizziness    Paresthesia  -     [facility] [place]    Intractable chronic cluster headache  -     Bri Dominguez MD, Neurology, [place]        Follow-up and Dispositions    Return if symptoms worsen or fail to improve, for keep yearly CPX 6/2023. Medication Reconciliation:  Current Medications Verified: Current medications/ immunizations were reviewed, including purpose, with patient. Family History, Social History, Current and Past Medical History was reviewed with patient and updated at today's office visit. Medication Reconciliation list was given to patient/ family. Patient was advised to discard old medication lists and provide all providers with current list at each visit and carry list with them in case of emergency.     Completed By:   Luis Chávez MD    [address], [address]  [phone]  [phone] fax  11:49 AM

## 2023-02-07 ENCOUNTER — TELEMEDICINE (OUTPATIENT)
Dept: NEUROLOGY | Age: 53
End: 2023-02-07
Payer: COMMERCIAL

## 2023-02-07 DIAGNOSIS — R41.89 SPELL OF ALTERED COGNITION: Primary | ICD-10-CM

## 2023-02-07 DIAGNOSIS — G43.009 MIGRAINE WITHOUT AURA AND WITHOUT STATUS MIGRAINOSUS, NOT INTRACTABLE: ICD-10-CM

## 2023-02-07 PROCEDURE — 99203 OFFICE O/P NEW LOW 30 MIN: CPT | Performed by: PSYCHIATRY & NEUROLOGY

## 2023-02-07 NOTE — PROGRESS NOTES
Oswaldo Martínez (:  1970) is a New patient, here for evaluation of the following:    Assessment & Plan   Below is the assessment and plan developed based on review of pertinent history, physical exam, labs, studies, and medications. 1. Spell of altered cognition  -     MRI BRAIN W WO CONTRAST; Future  2. Migraine without aura and without status migrainosus, not intractable  -     MRI BRAIN W WO CONTRAST; Future    Patient developed spells past a year with cognitive change following migraine headaches, frequency about once a month. Check brain MRI given family history of stroke, check EEG. Further recommendation will be made with above test results. Return for EEG. Subjective   Over the past a year, patient developed frequent headache in right temporal mostly, pressure pain extending to frontal and posterior, asso with dizziness lightheaded, duration 30 min to one hour, closed eyes and sat quiet to let it go. Frequency once a month. But right after headaches, she would have spacing out, losing contact, like heard conversation but did not understand, and forgot what she had talked, duration momentarily or in and out all day. Had fall accidents maybe twice long time ago when she was young, no LOC. No stroke history, no migraine or headaches prior. Drinks alcohol socially. No marijuana or other street drug. FH none epilepsy. His father had a seizure from stroke and heart attack. His father and uncle had strokes. Her maternal aunt has migraine. Ovarian cancer surgically removed, chemothx x 6 mo, in , no recurrence. Labs in 2022 normal CBC CMP. Review of Systems   Neurological:  Positive for speech change and headaches.          Objective   Patient-Reported Vitals  No data recorded     Physical Exam  [INSTRUCTIONS:  \"[x]\" Indicates a positive item  \"[]\" Indicates a negative item  -- DELETE ALL ITEMS NOT EXAMINED]    Constitutional: [x] Appears well-developed and well-nourished [x] No apparent distress      [] Abnormal -     Mental status: [x] Alert and awake  [x] Oriented to person/place/time [x] Able to follow commands. Provided clear history, memory capacity was normal, no dysphasia. [] Abnormal -     Eyes:   EOM    [x]  Normal    [] Abnormal -   Sclera  [x]  Normal    [] Abnormal -          Discharge [x]  None visible   [] Abnormal -     HENT: [x] Normocephalic, atraumatic  [] Abnormal -   [x]     External Ears [x] Normal hearing [] Abnormal -    Neck: [x]  [] Abnormal -     Pulmonary/Chest: [x] Respiratory effort normal   [x] No visualized signs of difficulty breathing or respiratory distress        [] Abnormal -      Musculoskeletal:   [x] Normal gait with no signs of ataxia         [x] Normal range of motion of neck        [] Abnormal -     Neurological:        [x] No Facial Asymmetry (Cranial nerve 7 motor function) (limited exam due to video visit)          [x] No gaze palsy        [] Abnormal -          Skin:        [x]          [] Abnormal -            Psychiatric:       [x] Normal Affect [] Abnormal -        [x] No Hallucinations    Other pertinent observable physical exam findings:-         On this date 2/7/2023 I have spent 45 minutes reviewing previous notes, test results and face to face (virtual) with the patient discussing the diagnosis and importance of compliance with the treatment plan as well as documenting on the day of the visitPatrick Brandan Carlos Eduardo, was evaluated through a synchronous (real-time) audio-video encounter. The patient (or guardian if applicable) is aware that this is a billable service, which includes applicable co-pays. This Virtual Visit was conducted with patient's (and/or legal guardian's) consent. The visit was conducted pursuant to the emergency declaration under the [facility], [address] [place] [address] authority and the [employer] Act.   Patient identification was verified, and a caregiver was present when appropriate.    The patient was located at Home: [address] [place] [phone]  Provider was located at Home ([facility]): Mae Gayle MD

## 2023-02-27 ENCOUNTER — HOSPITAL ENCOUNTER (OUTPATIENT)
Dept: MRI IMAGING | Age: 53
Discharge: HOME | End: 2023-03-02

## 2023-02-27 DIAGNOSIS — G43.009 MIGRAINE WITHOUT AURA AND WITHOUT STATUS MIGRAINOSUS, NOT INTRACTABLE: ICD-10-CM

## 2023-02-27 DIAGNOSIS — R41.89 SPELL OF ALTERED COGNITION: ICD-10-CM

## 2023-03-06 ENCOUNTER — OFFICE VISIT (OUTPATIENT)
Dept: NEUROLOGY | Age: 53
End: 2023-03-06
Payer: COMMERCIAL

## 2023-03-06 DIAGNOSIS — R41.89 SPELL OF ALTERED COGNITION: ICD-10-CM

## 2023-03-06 DIAGNOSIS — G43.009 MIGRAINE WITHOUT AURA AND WITHOUT STATUS MIGRAINOSUS, NOT INTRACTABLE: ICD-10-CM

## 2023-03-06 DIAGNOSIS — R41.82 ALTERED MENTAL STATUS, UNSPECIFIED ALTERED MENTAL STATUS TYPE: Primary | ICD-10-CM

## 2023-03-06 PROCEDURE — 95819 EEG AWAKE AND ASLEEP: CPT | Performed by: PSYCHIATRY & NEUROLOGY

## 2023-03-06 NOTE — PROGRESS NOTES
[address], [address] [facility], [address] [facility]    Routine Electroencephalogram Report      DATE: 3/6/2023    EEG Number:      Indication:  AMS/ migraines    Medications:   Current Outpatient Medications   Medication Sig Dispense Refill    UNABLE TO FIND ProFema Menopause Multi  Transitions Menopause Support      Bacillus Coagulans-Inulin (PROBIOTIC) 1-250 BILLION-MG CAPS Take by mouth      Ascorbic Acid (VITAMIN C) 250 MG tablet Take 500 mg by mouth daily      zinc gluconate 50 MG tablet Take 50 mg by mouth daily      Biotin 2.5 MG CAPS Take by mouth      Calcium Carbonate-Vitamin D (CALCIUM-VITAMIN D) 600-125 MG-UNIT TABS Take by mouth      Cholecalciferol 50 MCG ( UT) TABS Take by mouth      cyanocobalamin 100 MCG tablet Take 100 mcg by mouth daily       No current facility-administered medications for this visit. Technique: This EEG was performed using the [employer] 10/20 System. An EKG was monitored. The length of the recording was 30 minutes. State of Consciousness: awake, drowsy, and asleep      Description:  Background showed normal alpha rhythm 10 Hz and symmetrical.  Beta 20 to 30 Hz, low-voltage, anterior regions and symmetrical.  Abundant drowsy periods, non-REM sleep stage I or II with normal potentials. Frequent vertex sharp waves appeared during sleep stage I, normal variant. No spike and slow wave complex, no abnormal rhythmic activities. Activation Procedures:  Hyperventilation: deferred  Photic Stimulation: Minimal driving, symmetrical    EK BPM regular  Oxymetry: 97 to 100%      Interpretation: Normal electroencephalogram, awake, asleep and with activation procedure. There are no epileptiform discharges or pathological slowing activities.   EKG monitoring and oximetry are normal.    Mary Aranda MD

## 2023-07-09 SDOH — ECONOMIC STABILITY: FOOD INSECURITY: WITHIN THE PAST 12 MONTHS, THE FOOD YOU BOUGHT JUST DIDN'T LAST AND YOU DIDN'T HAVE MONEY TO GET MORE.: NEVER TRUE

## 2023-07-09 SDOH — ECONOMIC STABILITY: HOUSING INSECURITY
IN THE LAST 12 MONTHS, WAS THERE A TIME WHEN YOU DID NOT HAVE A STEADY PLACE TO SLEEP OR SLEPT IN A SHELTER (INCLUDING NOW)?: NO

## 2023-07-09 SDOH — ECONOMIC STABILITY: TRANSPORTATION INSECURITY
IN THE PAST 12 MONTHS, HAS LACK OF TRANSPORTATION KEPT YOU FROM MEETINGS, WORK, OR FROM GETTING THINGS NEEDED FOR DAILY LIVING?: NO

## 2023-07-09 SDOH — ECONOMIC STABILITY: INCOME INSECURITY: HOW HARD IS IT FOR YOU TO PAY FOR THE VERY BASICS LIKE FOOD, HOUSING, MEDICAL CARE, AND HEATING?: NOT HARD AT ALL

## 2023-07-09 SDOH — ECONOMIC STABILITY: FOOD INSECURITY: WITHIN THE PAST 12 MONTHS, YOU WORRIED THAT YOUR FOOD WOULD RUN OUT BEFORE YOU GOT MONEY TO BUY MORE.: NEVER TRUE

## 2023-07-10 ENCOUNTER — OFFICE VISIT (OUTPATIENT)
Dept: OBGYN CLINIC | Age: 53
End: 2023-07-10
Payer: COMMERCIAL

## 2023-07-10 VITALS
DIASTOLIC BLOOD PRESSURE: 78 MMHG | SYSTOLIC BLOOD PRESSURE: 118 MMHG | WEIGHT: 172 LBS | HEIGHT: 66 IN | BODY MASS INDEX: 27.64 KG/M2

## 2023-07-10 DIAGNOSIS — R10.2 PELVIC PAIN: ICD-10-CM

## 2023-07-10 DIAGNOSIS — Z12.4 CERVICAL CANCER SCREENING: ICD-10-CM

## 2023-07-10 DIAGNOSIS — Z01.419 ENCOUNTER FOR WELL WOMAN EXAM WITH ROUTINE GYNECOLOGICAL EXAM: Primary | ICD-10-CM

## 2023-07-10 PROCEDURE — 99396 PREV VISIT EST AGE 40-64: CPT | Performed by: OBSTETRICS & GYNECOLOGY

## 2023-07-10 NOTE — PROGRESS NOTES
DOMINGO Azevedo is a 45 y.o. female seen for annual GYN exam.    Past Medical History, Past Surgical History, Family history, Social History, and Medications were all reviewed with the patient today and updated as necessary. Current Outpatient Medications   Medication Sig    UNABLE TO FIND ProFema Menopause Multi  Transitions Menopause Support    Bacillus Coagulans-Inulin (PROBIOTIC) 1-250 BILLION-MG CAPS Take by mouth    Ascorbic Acid (VITAMIN C) 250 MG tablet Take 2 tablets by mouth daily    zinc gluconate 50 MG tablet Take 1 tablet by mouth daily    Biotin 2.5 MG CAPS Take by mouth    Calcium Carbonate-Vitamin D (CALCIUM-VITAMIN D) 600-125 MG-UNIT TABS Take by mouth    Cholecalciferol 50 MCG (2000 UT) TABS Take by mouth    cyanocobalamin 100 MCG tablet Take 1 tablet by mouth daily     No current facility-administered medications for this visit. Allergies   Allergen Reactions    Latex Anaphylaxis    Penicillins Swelling     Was given it in 1990 during surgery and told by the medical staff that she is allergic. She was unsure of the reaction she had to the drug.     Papaya Rash     Past Medical History:   Diagnosis Date    Anxiety     Burning with urination     history-resolved     Headache     History of mammogram 07/2020    History of Papanicolaou smear of cervix 2020    Dr. Jake Wong    Hx of seasonal allergies     Ovarian cancer ([address]) 1990    Left ovary removed     Past Surgical History:   Procedure Laterality Date    APPENDECTOMY  1990    BREAST BIOPSY Left     Benign per pt    COLONOSCOPY N/A 4/15/2019    COLONOSCOPY/ 28 performed by Lana Holder MD at [facility] Left 1993, 1999    OVARY REMOVAL Left 1990     Family History   Problem Relation Age of Onset    Breast Cancer Neg Hx     Osteoarthritis Mother     Diabetes Father     Stroke Paternal Uncle     Diabetes Paternal Uncle     Heart Disease Father     Stomach Cancer Paternal Uncle       Social
Uncle     Heart Disease Father     Stomach Cancer Paternal Uncle       Social History     Tobacco Use    Smoking status: Never    Smokeless tobacco: Never   Substance Use Topics    Alcohol use: Yes       Social History     Substance and Sexual Activity   Sexual Activity Not Currently     OB History    Para Term  AB Living   0 0 0 0 0 0   SAB IAB Ectopic Molar Multiple Live Births   0 0 0 0 0 0       Health Maintenance  Mammogram: Scheduled for 2023  Colonoscopy:   Bone Density:none  Pap smear:8/3/22 (repeat due to unsatisfactory )      Review of Systems  General: Not Present- Chills, Fever, Fatigue, Insomnia, Hot flashes/Night sweats, Weight gain  Skin: Not Present- Bruising, Change in Wart/Mole, Excessive Sweating, Itching, Nail Changes, New Lesions, Rash, Skin Color Changes and Ulcer. HEENT: Not Present- Headache, Blurred Vision, Double Vision, Glaucoma, Visual Disturbances, Hearing Loss, Ringing in the Ears, Vertigo, Nose Bleed, Bleeding Gums, Hoarseness and Sore Throat. Neck: Not Present- Neck Pain and Neck Swelling. Respiratory: Not Present- Cough, Difficulty Breathing and Difficulty Breathing on Exertion. Breast: Not Present- Breast Mass, Breast Pain, Breast Swelling, Nipple Discharge, Nipple Pain, Recent Breast Size Changes and Skin Changes. Cardiovascular: Not Present- Abnormal Blood Pressure, Chest Pain, Edema, Fainting / Blacking Out, Palpitations, Shortness of Breath and Swelling of Extremities. Gastrointestinal: Not Present- Abdominal Pain, Abdominal Swelling, Bloating, Change in Bowel Habits, Constipation, Diarrhea, Difficulty Swallowing, Gets full quickly at meals, Nausea, Rectal Bleeding and Vomiting.   Female Genitourinary: Not Present- Dysmenorrhea, Dyspareunia, Decreased libido, Excessive Menstrual Bleeding, Menstrual Irregularities, Pelvic Pain, Urinary Complaints, Vaginal Discharge, Vaginal itching/burning, Vaginal odor  Musculoskeletal: Not Present- Joint Pain and

## 2023-07-17 ENCOUNTER — OFFICE VISIT (OUTPATIENT)
Dept: OBGYN CLINIC | Age: 53
End: 2023-07-17
Payer: COMMERCIAL

## 2023-07-17 VITALS
DIASTOLIC BLOOD PRESSURE: 78 MMHG | BODY MASS INDEX: 27.48 KG/M2 | WEIGHT: 171 LBS | SYSTOLIC BLOOD PRESSURE: 122 MMHG | HEIGHT: 66 IN

## 2023-07-17 DIAGNOSIS — R10.2 PELVIC PAIN: Primary | ICD-10-CM

## 2023-07-17 PROCEDURE — 99214 OFFICE O/P EST MOD 30 MIN: CPT | Performed by: OBSTETRICS & GYNECOLOGY

## 2023-07-17 PROCEDURE — 76830 TRANSVAGINAL US NON-OB: CPT | Performed by: OBSTETRICS & GYNECOLOGY

## 2023-07-24 ENCOUNTER — HOSPITAL ENCOUNTER (OUTPATIENT)
Dept: LAB | Age: 53
Discharge: HOME | End: 2023-07-27

## 2023-07-24 DIAGNOSIS — R73.03 PREDIABETES: ICD-10-CM

## 2023-07-24 DIAGNOSIS — E78.2 MIXED HYPERLIPIDEMIA: ICD-10-CM

## 2023-07-24 LAB
ALBUMIN SERPL-MCNC: 3.8 G/DL (ref 3.5–5)
ALBUMIN/GLOB SERPL: 1.1 (ref 0.4–1.6)
ALP SERPL-CCNC: 105 U/L (ref 50–136)
ALT SERPL-CCNC: 37 U/L (ref 12–65)
ANION GAP SERPL CALC-SCNC: 6 MMOL/L (ref 2–11)
APPEARANCE UR: CLEAR
AST SERPL-CCNC: 22 U/L (ref 15–37)
BASOPHILS # BLD: 0.1 K/UL (ref 0–0.2)
BASOPHILS NFR BLD: 1 % (ref 0–2)
BILIRUB SERPL-MCNC: 0.4 MG/DL (ref 0.2–1.1)
BILIRUB UR QL: NEGATIVE
BUN SERPL-MCNC: 17 MG/DL (ref 6–23)
CALCIUM SERPL-MCNC: 9.4 MG/DL (ref 8.3–10.4)
CHLORIDE SERPL-SCNC: 107 MMOL/L (ref 101–110)
CHOLEST SERPL-MCNC: 241 MG/DL
CO2 SERPL-SCNC: 26 MMOL/L (ref 21–32)
COLOR UR: ABNORMAL
CREAT SERPL-MCNC: 0.8 MG/DL (ref 0.6–1)
DIFFERENTIAL METHOD BLD: NORMAL
EOSINOPHIL # BLD: 0.1 K/UL (ref 0–0.8)
EOSINOPHIL NFR BLD: 2 % (ref 0.5–7.8)
ERYTHROCYTE [DISTWIDTH] IN BLOOD BY AUTOMATED COUNT: 12.9 % (ref 11.9–14.6)
GLOBULIN SER CALC-MCNC: 3.6 G/DL (ref 2.8–4.5)
GLUCOSE SERPL-MCNC: 111 MG/DL (ref 65–100)
GLUCOSE UR STRIP.AUTO-MCNC: NEGATIVE MG/DL
HCT VFR BLD AUTO: 42.8 % (ref 35.8–46.3)
HDLC SERPL-MCNC: 76 MG/DL (ref 40–60)
HDLC SERPL: 3.2
HGB BLD-MCNC: 14 G/DL (ref 11.7–15.4)
HGB UR QL STRIP: NEGATIVE
IMM GRANULOCYTES # BLD AUTO: 0 K/UL (ref 0–0.5)
IMM GRANULOCYTES NFR BLD AUTO: 0 % (ref 0–5)
KETONES UR QL STRIP.AUTO: NEGATIVE MG/DL
LDLC SERPL CALC-MCNC: 145.8 MG/DL
LEUKOCYTE ESTERASE UR QL STRIP.AUTO: NEGATIVE
LYMPHOCYTES # BLD: 2.6 K/UL (ref 0.5–4.6)
LYMPHOCYTES NFR BLD: 43 % (ref 13–44)
MCH RBC QN AUTO: 28.4 PG (ref 26.1–32.9)
MCHC RBC AUTO-ENTMCNC: 32.7 G/DL (ref 31.4–35)
MCV RBC AUTO: 86.8 FL (ref 82–102)
MONOCYTES # BLD: 0.4 K/UL (ref 0.1–1.3)
MONOCYTES NFR BLD: 7 % (ref 4–12)
NEUTS SEG # BLD: 2.8 K/UL (ref 1.7–8.2)
NEUTS SEG NFR BLD: 47 % (ref 43–78)
NITRITE UR QL STRIP.AUTO: NEGATIVE
NRBC # BLD: 0 K/UL (ref 0–0.2)
PH UR STRIP: 6 (ref 5–9)
PLATELET # BLD AUTO: 305 K/UL (ref 150–450)
PMV BLD AUTO: 10.4 FL (ref 9.4–12.3)
POTASSIUM SERPL-SCNC: 4.2 MMOL/L (ref 3.5–5.1)
PROT SERPL-MCNC: 7.4 G/DL (ref 6.3–8.2)
PROT UR STRIP-MCNC: NEGATIVE MG/DL
RBC # BLD AUTO: 4.93 M/UL (ref 4.05–5.2)
SODIUM SERPL-SCNC: 139 MMOL/L (ref 133–143)
SP GR UR REFRACTOMETRY: 1.02 (ref 1–1.02)
T4 FREE SERPL-MCNC: 1 NG/DL (ref 0.78–1.46)
TRIGL SERPL-MCNC: 96 MG/DL (ref 35–150)
TSH, 3RD GENERATION: 1.08 UIU/ML (ref 0.36–3.74)
UROBILINOGEN UR QL STRIP.AUTO: 0.2 EU/DL (ref 0.2–1)
VLDLC SERPL CALC-MCNC: 19.2 MG/DL (ref 6–23)
WBC # BLD AUTO: 6.1 K/UL (ref 4.3–11.1)

## 2023-07-25 LAB
EST. AVERAGE GLUCOSE BLD GHB EST-MCNC: 126 MG/DL
HBA1C MFR BLD: 6 % (ref 4.8–5.6)

## 2023-08-02 SDOH — HEALTH STABILITY: PHYSICAL HEALTH: ON AVERAGE, HOW MANY DAYS PER WEEK DO YOU ENGAGE IN MODERATE TO STRENUOUS EXERCISE (LIKE A BRISK WALK)?: 0 DAYS

## 2023-08-02 SDOH — HEALTH STABILITY: PHYSICAL HEALTH: ON AVERAGE, HOW MANY MINUTES DO YOU ENGAGE IN EXERCISE AT THIS LEVEL?: 10 MIN

## 2023-08-02 ASSESSMENT — SOCIAL DETERMINANTS OF HEALTH (SDOH)

## 2023-08-02 ASSESSMENT — PATIENT HEALTH QUESTIONNAIRE - PHQ9
1. LITTLE INTEREST OR PLEASURE IN DOING THINGS: 1
SUM OF ALL RESPONSES TO PHQ QUESTIONS 1-9: 2
2. FEELING DOWN, DEPRESSED OR HOPELESS: 1
SUM OF ALL RESPONSES TO PHQ9 QUESTIONS 1 & 2: 2
SUM OF ALL RESPONSES TO PHQ QUESTIONS 1-9: 2

## 2023-08-03 SDOH — HEALTH STABILITY: PHYSICAL HEALTH: ON AVERAGE, HOW MANY DAYS PER WEEK DO YOU ENGAGE IN MODERATE TO STRENUOUS EXERCISE (LIKE A BRISK WALK)?: 0 DAYS

## 2023-08-03 SDOH — HEALTH STABILITY: PHYSICAL HEALTH: ON AVERAGE, HOW MANY MINUTES DO YOU ENGAGE IN EXERCISE AT THIS LEVEL?: 10 MIN

## 2023-08-03 ASSESSMENT — PATIENT HEALTH QUESTIONNAIRE - PHQ9
2. FEELING DOWN, DEPRESSED OR HOPELESS: SEVERAL DAYS
SUM OF ALL RESPONSES TO PHQ9 QUESTIONS 1 & 2: 2
1. LITTLE INTEREST OR PLEASURE IN DOING THINGS: SEVERAL DAYS

## 2023-08-04 ENCOUNTER — HOSPITAL ENCOUNTER (OUTPATIENT)
Dept: MAMMOGRAPHY | Age: 53
Discharge: HOME | End: 2023-08-04
Attending: OBSTETRICS & GYNECOLOGY
Payer: COMMERCIAL

## 2023-08-04 ENCOUNTER — OFFICE VISIT (OUTPATIENT)
Dept: INTERNAL MEDICINE CLINIC | Facility: CLINIC | Age: 53
End: 2023-08-04
Payer: COMMERCIAL

## 2023-08-04 VITALS
HEIGHT: 66 IN | TEMPERATURE: 97.2 F | WEIGHT: 172 LBS | DIASTOLIC BLOOD PRESSURE: 72 MMHG | OXYGEN SATURATION: 96 % | HEART RATE: 81 BPM | SYSTOLIC BLOOD PRESSURE: 112 MMHG | BODY MASS INDEX: 27.64 KG/M2

## 2023-08-04 DIAGNOSIS — Z12.31 VISIT FOR SCREENING MAMMOGRAM: ICD-10-CM

## 2023-08-04 DIAGNOSIS — R06.81 BREATHLESSNESS ON EXERTION: ICD-10-CM

## 2023-08-04 DIAGNOSIS — M79.18 MUSCLE ACHE OF EXTREMITY: ICD-10-CM

## 2023-08-04 DIAGNOSIS — Z00.00 PHYSICAL EXAM: Primary | ICD-10-CM

## 2023-08-04 LAB
25(OH)D3 SERPL-MCNC: 28.9 NG/ML (ref 30–100)
MAGNESIUM SERPL-MCNC: 2.2 MG/DL (ref 1.8–2.4)

## 2023-08-04 PROCEDURE — 77063 BREAST TOMOSYNTHESIS BI: CPT

## 2023-08-04 PROCEDURE — 99396 PREV VISIT EST AGE 40-64: CPT | Performed by: NURSE PRACTITIONER

## 2023-08-04 ASSESSMENT — ENCOUNTER SYMPTOMS
ABDOMINAL PAIN: 0
COLOR CHANGE: 0
EYE PAIN: 0
EYE REDNESS: 0
DIARRHEA: 0
SHORTNESS OF BREATH: 0
EYE DISCHARGE: 0
BACK PAIN: 0
COUGH: 0
ABDOMINAL DISTENTION: 0
SINUS PAIN: 0
EYE ITCHING: 0
APNEA: 0
CHEST TIGHTNESS: 1
NAUSEA: 0
CONSTIPATION: 0

## 2023-08-04 NOTE — PROGRESS NOTES
of cervix 2020    Dr. Conchita Cleaning    Hx of seasonal allergies     Ovarian cancer [facility])     Left ovary removed     Past Surgical History:   Procedure Laterality Date    APPENDECTOMY      BREAST BIOPSY Left     Benign per pt    COLONOSCOPY N/A 4/15/2019    COLONOSCOPY/ 28 performed by Alexandre Wang MD at [place] Left ,     OVARY REMOVAL Left      Family History   Problem Relation Age of Onset    Osteoarthritis Mother     Asthma Mother     Diabetes Father     Heart Disease Father     Stroke Paternal Uncle     Diabetes Paternal Uncle     Stomach Cancer Paternal Uncle     Arthritis Maternal Grandmother             Breast Cancer Neg Hx      Social History     Tobacco Use    Smoking status: Never    Smokeless tobacco: Never   Substance Use Topics    Alcohol use: Not Currently     Alcohol/week: 1.0 standard drink     Types: 1 Glasses of wine per week     Comment: Every once in a while special occasion only       Review of Systems   Constitutional:  Negative for activity change, appetite change, chills, fatigue and fever. HENT:  Negative for congestion, ear pain and sinus pain. Eyes:  Negative for pain, discharge, redness and itching. Respiratory:  Positive for chest tightness (with exertional dyspnea about 1-2x per month). Negative for apnea, cough and shortness of breath. Cardiovascular:  Negative for chest pain, palpitations and leg swelling. Gastrointestinal:  Negative for abdominal distention, abdominal pain, constipation, diarrhea and nausea. Endocrine: Negative for cold intolerance and heat intolerance. Genitourinary:  Negative for difficulty urinating, dysuria, enuresis and urgency. Musculoskeletal:  Negative for arthralgias, back pain, joint swelling, myalgias and neck pain. Skin:  Negative for color change and rash. Neurological:  Negative for dizziness, weakness and headaches.    Psychiatric/Behavioral:  Negative for behavioral

## 2023-08-09 DIAGNOSIS — R06.81 BREATHLESSNESS ON EXERTION: ICD-10-CM

## 2023-08-09 DIAGNOSIS — R07.9 CHEST PAIN, UNSPECIFIED TYPE: Primary | ICD-10-CM

## 2023-08-10 ENCOUNTER — CLINICAL DOCUMENTATION (OUTPATIENT)
Dept: NEUROLOGY | Age: 53
End: 2023-08-10

## 2023-08-22 ENCOUNTER — HOSPITAL ENCOUNTER (OUTPATIENT)
Dept: NON INVASIVE DIAGNOSTICS | Age: 53
Discharge: HOME | End: 2023-08-24
Attending: INTERNAL MEDICINE
Payer: COMMERCIAL

## 2023-08-22 DIAGNOSIS — R07.9 CHEST PAIN, UNSPECIFIED TYPE: ICD-10-CM

## 2023-08-22 DIAGNOSIS — R06.81 BREATHLESSNESS ON EXERTION: ICD-10-CM

## 2023-08-22 PROCEDURE — 93017 CV STRESS TEST TRACING ONLY: CPT

## 2023-08-24 LAB
STRESS BASELINE DIAS BP: 77 MMHG
STRESS BASELINE HR: 69 BPM
STRESS BASELINE ST DEPRESSION: 0 MM
STRESS BASELINE SYS BP: 126 MMHG
STRESS EXERCISE DUR MIN: 6 MIN
STRESS EXERCISE DUR SEC: 52 SEC
STRESS PEAK DIAS BP: 74 MMHG
STRESS PEAK SYS BP: 172 MMHG
STRESS PERCENT HR ACHIEVED: 92 %
STRESS POST PEAK HR: 151 BPM
STRESS RATE PRESSURE PRODUCT: NORMAL BPM*MMHG
STRESS ST DEPRESSION: 0 MM
STRESS STAGE 1 BP: NORMAL MMHG
STRESS STAGE 1 DURATION: 3 MIN:SEC
STRESS STAGE 1 HR: 144 BPM
STRESS STAGE 2 BP: NORMAL MMHG
STRESS STAGE 2 DURATION: 3 MIN:SEC
STRESS STAGE 2 HR: 146 BPM
STRESS STAGE 3 BP: NORMAL MMHG
STRESS STAGE 3 DURATION: 3 MIN:SEC
STRESS STAGE 3 HR: 150 BPM
STRESS STAGE RECOVERY 1 BP: NORMAL MMHG
STRESS STAGE RECOVERY 1 DURATION: 2 MIN:SEC
STRESS STAGE RECOVERY 1 HR: 148 BPM
STRESS STAGE RECOVERY 2 BP: NORMAL MMHG
STRESS STAGE RECOVERY 2 DURATION: 2 MIN:SEC
STRESS STAGE RECOVERY 2 HR: 136 BPM
STRESS STAGE RECOVERY 3 BP: NORMAL MMHG
STRESS STAGE RECOVERY 3 DURATION: 2 MIN:SEC
STRESS STAGE RECOVERY 3 HR: 94 BPM
STRESS STAGE RECOVERY 4 BP: NORMAL MMHG
STRESS STAGE RECOVERY 4 DURATION: 2 MIN:SEC
STRESS STAGE RECOVERY 4 HR: 88 BPM
STRESS TARGET HR: 165 BPM

## 2024-03-12 ENCOUNTER — OFFICE VISIT (OUTPATIENT)
Dept: INTERNAL MEDICINE CLINIC | Facility: CLINIC | Age: 54
End: 2024-03-12
Payer: COMMERCIAL

## 2024-03-12 ENCOUNTER — TRANSCRIBE ORDERS (OUTPATIENT)
Dept: SCHEDULING | Age: 54
End: 2024-03-12

## 2024-03-12 VITALS
BODY MASS INDEX: 28.22 KG/M2 | OXYGEN SATURATION: 96 % | HEIGHT: 66 IN | WEIGHT: 175.6 LBS | HEART RATE: 90 BPM | RESPIRATION RATE: 16 BRPM | DIASTOLIC BLOOD PRESSURE: 80 MMHG | SYSTOLIC BLOOD PRESSURE: 120 MMHG | TEMPERATURE: 98.2 F

## 2024-03-12 DIAGNOSIS — Z11.4 ENCOUNTER FOR SCREENING FOR HIV: ICD-10-CM

## 2024-03-12 DIAGNOSIS — E55.9 VITAMIN D INSUFFICIENCY: ICD-10-CM

## 2024-03-12 DIAGNOSIS — Z12.31 SCREENING MAMMOGRAM FOR HIGH-RISK PATIENT: Primary | ICD-10-CM

## 2024-03-12 DIAGNOSIS — Z11.59 ENCOUNTER FOR HEPATITIS C SCREENING TEST FOR LOW RISK PATIENT: ICD-10-CM

## 2024-03-12 DIAGNOSIS — R20.2 PARESTHESIA OF BILATERAL LEGS: ICD-10-CM

## 2024-03-12 DIAGNOSIS — R20.2 PARESTHESIA AND PAIN OF BOTH UPPER EXTREMITIES: Primary | ICD-10-CM

## 2024-03-12 DIAGNOSIS — R10.11 RUQ ABDOMINAL PAIN: ICD-10-CM

## 2024-03-12 DIAGNOSIS — E78.2 MIXED HYPERLIPIDEMIA: ICD-10-CM

## 2024-03-12 DIAGNOSIS — M79.602 PARESTHESIA AND PAIN OF BOTH UPPER EXTREMITIES: Primary | ICD-10-CM

## 2024-03-12 DIAGNOSIS — R73.03 PREDIABETES: ICD-10-CM

## 2024-03-12 DIAGNOSIS — M79.601 PARESTHESIA AND PAIN OF BOTH UPPER EXTREMITIES: Primary | ICD-10-CM

## 2024-03-12 PROCEDURE — 99214 OFFICE O/P EST MOD 30 MIN: CPT | Performed by: INTERNAL MEDICINE

## 2024-03-12 SDOH — ECONOMIC STABILITY: FOOD INSECURITY: WITHIN THE PAST 12 MONTHS, THE FOOD YOU BOUGHT JUST DIDN'T LAST AND YOU DIDN'T HAVE MONEY TO GET MORE.: NEVER TRUE

## 2024-03-12 SDOH — ECONOMIC STABILITY: INCOME INSECURITY: HOW HARD IS IT FOR YOU TO PAY FOR THE VERY BASICS LIKE FOOD, HOUSING, MEDICAL CARE, AND HEATING?: NOT HARD AT ALL

## 2024-03-12 SDOH — ECONOMIC STABILITY: FOOD INSECURITY: WITHIN THE PAST 12 MONTHS, YOU WORRIED THAT YOUR FOOD WOULD RUN OUT BEFORE YOU GOT MONEY TO BUY MORE.: NEVER TRUE

## 2024-03-12 ASSESSMENT — PATIENT HEALTH QUESTIONNAIRE - PHQ9
SUM OF ALL RESPONSES TO PHQ9 QUESTIONS 1 & 2: 2
2. FEELING DOWN, DEPRESSED OR HOPELESS: SEVERAL DAYS
SUM OF ALL RESPONSES TO PHQ QUESTIONS 1-9: 2
SUM OF ALL RESPONSES TO PHQ QUESTIONS 1-9: 2
1. LITTLE INTEREST OR PLEASURE IN DOING THINGS: SEVERAL DAYS
SUM OF ALL RESPONSES TO PHQ9 QUESTIONS 1 & 2: 2
1. LITTLE INTEREST OR PLEASURE IN DOING THINGS: 1
SUM OF ALL RESPONSES TO PHQ QUESTIONS 1-9: 2
SUM OF ALL RESPONSES TO PHQ QUESTIONS 1-9: 2
2. FEELING DOWN, DEPRESSED OR HOPELESS: 1

## 2024-03-12 ASSESSMENT — ENCOUNTER SYMPTOMS
COUGH: 0
VOMITING: 0
CHEST TIGHTNESS: 0
BLOOD IN STOOL: 0
SINUS PRESSURE: 0
RHINORRHEA: 0
NAUSEA: 0
DIARRHEA: 0
SHORTNESS OF BREATH: 0
ABDOMINAL PAIN: 0

## 2024-03-12 NOTE — PROGRESS NOTES
Future  -     Urinalysis; Future    Prediabetes  -     Hemoglobin A1C; Future    Encounter for screening for HIV  -     HIV 1/2 Ag/Ab, 4TH Generation,W Rflx Confirm; Future    Encounter for hepatitis C screening test for low risk patient  -     Hepatitis C Antibody; Future    Vitamin D insufficiency  -     Vitamin D 25 Hydroxy; Future          Follow-up and Dispositions    Return in about 5 months (around 8/12/2024), or if symptoms worsen or fail to improve, for CPX w/ labs.         Medication Reconciliation:  Current Medications Verified: Current medications/ immunizations were reviewed, including purpose, with patient.  Family History, Social History, Current and Past Medical History was reviewed with patient and updated at today's office visit.  Medication Reconciliation list was given to patient/ family.  Patient was advised to discard old medication lists and provide all providers with current list at each visit and carry list with them in case of emergency.    Completed By:   Swathi See MD    [facility]  [address] [place] [address], [address] [place] [address] [phone] fax  4:19 PM

## 2024-03-22 ENCOUNTER — HOSPITAL ENCOUNTER (OUTPATIENT)
Dept: ULTRASOUND IMAGING | Age: 54
Discharge: HOME | End: 2024-03-22
Attending: INTERNAL MEDICINE
Payer: COMMERCIAL

## 2024-03-22 DIAGNOSIS — R10.11 RUQ ABDOMINAL PAIN: ICD-10-CM

## 2024-03-22 PROCEDURE — 76700 US EXAM ABDOM COMPLETE: CPT

## 2024-06-16 ENCOUNTER — APPOINTMENT (OUTPATIENT)
Dept: GENERAL RADIOLOGY | Age: 54
DRG: 390 | End: 2024-06-16
Payer: COMMERCIAL

## 2024-06-16 ENCOUNTER — HOSPITAL ENCOUNTER (INPATIENT)
Age: 54
LOS: 1 days | Discharge: HOME | DRG: 390 | End: 2024-06-17
Attending: EMERGENCY MEDICINE | Admitting: FAMILY MEDICINE
Payer: COMMERCIAL

## 2024-06-16 ENCOUNTER — APPOINTMENT (OUTPATIENT)
Dept: CT IMAGING | Age: 54
DRG: 390 | End: 2024-06-16
Payer: COMMERCIAL

## 2024-06-16 DIAGNOSIS — K56.609 SBO (SMALL BOWEL OBSTRUCTION) (HCC): Primary | ICD-10-CM

## 2024-06-16 PROBLEM — D72.829 LEUKOCYTOSIS: Status: ACTIVE | Noted: 2024-06-16

## 2024-06-16 LAB
ALBUMIN SERPL-MCNC: 3.7 G/DL (ref 3.5–5)
ALBUMIN SERPL-MCNC: 3.9 G/DL (ref 3.5–5)
ALBUMIN/GLOB SERPL: 1.1 (ref 1–1.9)
ALBUMIN/GLOB SERPL: 1.2 (ref 1–1.9)
ALP SERPL-CCNC: 108 U/L (ref 35–104)
ALP SERPL-CCNC: 95 U/L (ref 35–104)
ALT SERPL-CCNC: 32 U/L (ref 12–65)
ALT SERPL-CCNC: 35 U/L (ref 12–65)
ANION GAP SERPL CALC-SCNC: 13 MMOL/L (ref 9–18)
ANION GAP SERPL CALC-SCNC: 13 MMOL/L (ref 9–18)
AST SERPL-CCNC: 24 U/L (ref 15–37)
AST SERPL-CCNC: 27 U/L (ref 15–37)
BASOPHILS # BLD: 0.1 K/UL (ref 0–0.2)
BASOPHILS NFR BLD: 0 % (ref 0–2)
BILIRUB SERPL-MCNC: 0.2 MG/DL (ref 0–1.2)
BILIRUB SERPL-MCNC: 0.4 MG/DL (ref 0–1.2)
BUN SERPL-MCNC: 11 MG/DL (ref 6–23)
BUN SERPL-MCNC: 13 MG/DL (ref 6–23)
CALCIUM SERPL-MCNC: 9.1 MG/DL (ref 8.8–10.2)
CALCIUM SERPL-MCNC: 9.7 MG/DL (ref 8.8–10.2)
CHLORIDE SERPL-SCNC: 101 MMOL/L (ref 98–107)
CHLORIDE SERPL-SCNC: 103 MMOL/L (ref 98–107)
CO2 SERPL-SCNC: 23 MMOL/L (ref 20–28)
CO2 SERPL-SCNC: 25 MMOL/L (ref 20–28)
CREAT SERPL-MCNC: 0.56 MG/DL (ref 0.6–1.1)
CREAT SERPL-MCNC: 0.74 MG/DL (ref 0.6–1.1)
DIFFERENTIAL METHOD BLD: ABNORMAL
EKG ATRIAL RATE: 70 BPM
EKG DIAGNOSIS: NORMAL
EKG P AXIS: 65 DEGREES
EKG P-R INTERVAL: 155 MS
EKG Q-T INTERVAL: 395 MS
EKG QRS DURATION: 92 MS
EKG QTC CALCULATION (BAZETT): 424 MS
EKG R AXIS: 51 DEGREES
EKG T AXIS: 34 DEGREES
EKG VENTRICULAR RATE: 69 BPM
EOSINOPHIL # BLD: 0.1 K/UL (ref 0–0.8)
EOSINOPHIL NFR BLD: 1 % (ref 0.5–7.8)
ERYTHROCYTE [DISTWIDTH] IN BLOOD BY AUTOMATED COUNT: 12.9 % (ref 11.9–14.6)
GLOBULIN SER CALC-MCNC: 3.1 G/DL (ref 2.3–3.5)
GLOBULIN SER CALC-MCNC: 3.5 G/DL (ref 2.3–3.5)
GLUCOSE SERPL-MCNC: 113 MG/DL (ref 70–99)
GLUCOSE SERPL-MCNC: 146 MG/DL (ref 70–99)
HCT VFR BLD AUTO: 42.5 % (ref 35.8–46.3)
HGB BLD-MCNC: 14.2 G/DL (ref 11.7–15.4)
IMM GRANULOCYTES # BLD AUTO: 0.1 K/UL (ref 0–0.5)
IMM GRANULOCYTES NFR BLD AUTO: 0 % (ref 0–5)
LACTATE SERPL-SCNC: 1.5 MMOL/L (ref 0.5–2)
LIPASE SERPL-CCNC: 28 U/L (ref 13–60)
LYMPHOCYTES # BLD: 2.7 K/UL (ref 0.5–4.6)
LYMPHOCYTES NFR BLD: 15 % (ref 13–44)
MCH RBC QN AUTO: 28.2 PG (ref 26.1–32.9)
MCHC RBC AUTO-ENTMCNC: 33.4 G/DL (ref 31.4–35)
MCV RBC AUTO: 84.5 FL (ref 82–102)
MONOCYTES # BLD: 0.9 K/UL (ref 0.1–1.3)
MONOCYTES NFR BLD: 5 % (ref 4–12)
NEUTS SEG # BLD: 14.4 K/UL (ref 1.7–8.2)
NEUTS SEG NFR BLD: 79 % (ref 43–78)
NRBC # BLD: 0 K/UL (ref 0–0.2)
PLATELET # BLD AUTO: 376 K/UL (ref 150–450)
PMV BLD AUTO: 10 FL (ref 9.4–12.3)
POTASSIUM SERPL-SCNC: 4.3 MMOL/L (ref 3.5–5.1)
POTASSIUM SERPL-SCNC: 4.6 MMOL/L (ref 3.5–5.1)
PROT SERPL-MCNC: 6.8 G/DL (ref 6.3–8.2)
PROT SERPL-MCNC: 7.4 G/DL (ref 6.3–8.2)
RBC # BLD AUTO: 5.03 M/UL (ref 4.05–5.2)
SODIUM SERPL-SCNC: 139 MMOL/L (ref 136–145)
SODIUM SERPL-SCNC: 139 MMOL/L (ref 136–145)
WBC # BLD AUTO: 18.1 K/UL (ref 4.3–11.1)

## 2024-06-16 PROCEDURE — 36415 COLL VENOUS BLD VENIPUNCTURE: CPT

## 2024-06-16 PROCEDURE — 74177 CT ABD & PELVIS W/CONTRAST: CPT

## 2024-06-16 PROCEDURE — 6370000000 HC RX 637 (ALT 250 FOR IP): Performed by: EMERGENCY MEDICINE

## 2024-06-16 PROCEDURE — 93005 ELECTROCARDIOGRAM TRACING: CPT | Performed by: EMERGENCY MEDICINE

## 2024-06-16 PROCEDURE — 2580000003 HC RX 258: Performed by: EMERGENCY MEDICINE

## 2024-06-16 PROCEDURE — 74018 RADEX ABDOMEN 1 VIEW: CPT

## 2024-06-16 PROCEDURE — 2580000003 HC RX 258: Performed by: FAMILY MEDICINE

## 2024-06-16 PROCEDURE — 85025 COMPLETE CBC W/AUTO DIFF WBC: CPT

## 2024-06-16 PROCEDURE — 1100000000 HC RM PRIVATE

## 2024-06-16 PROCEDURE — 6360000002 HC RX W HCPCS: Performed by: FAMILY MEDICINE

## 2024-06-16 PROCEDURE — 6360000004 HC RX CONTRAST MEDICATION: Performed by: EMERGENCY MEDICINE

## 2024-06-16 PROCEDURE — 83690 ASSAY OF LIPASE: CPT

## 2024-06-16 PROCEDURE — 93010 ELECTROCARDIOGRAM REPORT: CPT | Performed by: INTERNAL MEDICINE

## 2024-06-16 PROCEDURE — 99285 EMERGENCY DEPT VISIT HI MDM: CPT

## 2024-06-16 PROCEDURE — 83605 ASSAY OF LACTIC ACID: CPT

## 2024-06-16 PROCEDURE — 80053 COMPREHEN METABOLIC PANEL: CPT

## 2024-06-16 PROCEDURE — 0D9670Z DRAINAGE OF STOMACH WITH DRAINAGE DEVICE, VIA NATURAL OR ARTIFICIAL OPENING: ICD-10-PCS | Performed by: FAMILY MEDICINE

## 2024-06-16 RX ORDER — SODIUM CHLORIDE 0.9 % (FLUSH) 0.9 %
5-40 SYRINGE (ML) INJECTION EVERY 12 HOURS SCHEDULED
Status: DISCONTINUED | OUTPATIENT
Start: 2024-06-16 | End: 2024-06-17 | Stop reason: HOSPADM

## 2024-06-16 RX ORDER — MORPHINE SULFATE 2 MG/ML
2 INJECTION, SOLUTION INTRAMUSCULAR; INTRAVENOUS ONCE
Status: DISCONTINUED | OUTPATIENT
Start: 2024-06-16 | End: 2024-06-16

## 2024-06-16 RX ORDER — POTASSIUM CHLORIDE 20 MEQ/1
40 TABLET, EXTENDED RELEASE ORAL PRN
Status: DISCONTINUED | OUTPATIENT
Start: 2024-06-16 | End: 2024-06-17 | Stop reason: HOSPADM

## 2024-06-16 RX ORDER — ONDANSETRON 4 MG/1
4 TABLET, ORALLY DISINTEGRATING ORAL EVERY 8 HOURS PRN
Status: DISCONTINUED | OUTPATIENT
Start: 2024-06-16 | End: 2024-06-17 | Stop reason: HOSPADM

## 2024-06-16 RX ORDER — ONDANSETRON 2 MG/ML
4 INJECTION INTRAMUSCULAR; INTRAVENOUS EVERY 6 HOURS PRN
Status: DISCONTINUED | OUTPATIENT
Start: 2024-06-16 | End: 2024-06-16 | Stop reason: SDUPTHER

## 2024-06-16 RX ORDER — OXYCODONE HYDROCHLORIDE 5 MG/1
5 TABLET ORAL EVERY 4 HOURS PRN
Status: DISCONTINUED | OUTPATIENT
Start: 2024-06-16 | End: 2024-06-17

## 2024-06-16 RX ORDER — ACETAMINOPHEN 650 MG/1
650 SUPPOSITORY RECTAL EVERY 6 HOURS PRN
Status: DISCONTINUED | OUTPATIENT
Start: 2024-06-16 | End: 2024-06-17 | Stop reason: HOSPADM

## 2024-06-16 RX ORDER — METOCLOPRAMIDE HYDROCHLORIDE 5 MG/ML
10 INJECTION INTRAMUSCULAR; INTRAVENOUS
Status: DISCONTINUED | OUTPATIENT
Start: 2024-06-16 | End: 2024-06-16

## 2024-06-16 RX ORDER — SODIUM CHLORIDE 9 MG/ML
INJECTION, SOLUTION INTRAVENOUS CONTINUOUS
Status: DISCONTINUED | OUTPATIENT
Start: 2024-06-16 | End: 2024-06-17 | Stop reason: HOSPADM

## 2024-06-16 RX ORDER — ENOXAPARIN SODIUM 100 MG/ML
40 INJECTION SUBCUTANEOUS DAILY
Status: DISCONTINUED | OUTPATIENT
Start: 2024-06-16 | End: 2024-06-17 | Stop reason: HOSPADM

## 2024-06-16 RX ORDER — MORPHINE SULFATE 2 MG/ML
1 INJECTION, SOLUTION INTRAMUSCULAR; INTRAVENOUS EVERY 4 HOURS PRN
Status: DISCONTINUED | OUTPATIENT
Start: 2024-06-16 | End: 2024-06-17

## 2024-06-16 RX ORDER — SODIUM CHLORIDE 0.9 % (FLUSH) 0.9 %
5-40 SYRINGE (ML) INJECTION PRN
Status: DISCONTINUED | OUTPATIENT
Start: 2024-06-16 | End: 2024-06-17 | Stop reason: HOSPADM

## 2024-06-16 RX ORDER — ONDANSETRON 2 MG/ML
4 INJECTION INTRAMUSCULAR; INTRAVENOUS EVERY 6 HOURS PRN
Status: DISCONTINUED | OUTPATIENT
Start: 2024-06-16 | End: 2024-06-17 | Stop reason: HOSPADM

## 2024-06-16 RX ORDER — DICYCLOMINE HYDROCHLORIDE 10 MG/1
20 CAPSULE ORAL
Status: COMPLETED | OUTPATIENT
Start: 2024-06-16 | End: 2024-06-16

## 2024-06-16 RX ORDER — SODIUM CHLORIDE 9 MG/ML
INJECTION, SOLUTION INTRAVENOUS PRN
Status: DISCONTINUED | OUTPATIENT
Start: 2024-06-16 | End: 2024-06-17 | Stop reason: HOSPADM

## 2024-06-16 RX ORDER — 0.9 % SODIUM CHLORIDE 0.9 %
1000 INTRAVENOUS SOLUTION INTRAVENOUS
Status: COMPLETED | OUTPATIENT
Start: 2024-06-16 | End: 2024-06-16

## 2024-06-16 RX ORDER — ACETAMINOPHEN 325 MG/1
650 TABLET ORAL EVERY 6 HOURS PRN
Status: DISCONTINUED | OUTPATIENT
Start: 2024-06-16 | End: 2024-06-17 | Stop reason: HOSPADM

## 2024-06-16 RX ORDER — POTASSIUM CHLORIDE 7.45 MG/ML
10 INJECTION INTRAVENOUS PRN
Status: DISCONTINUED | OUTPATIENT
Start: 2024-06-16 | End: 2024-06-17 | Stop reason: HOSPADM

## 2024-06-16 RX ORDER — MAGNESIUM SULFATE IN WATER 40 MG/ML
2000 INJECTION, SOLUTION INTRAVENOUS PRN
Status: DISCONTINUED | OUTPATIENT
Start: 2024-06-16 | End: 2024-06-17 | Stop reason: HOSPADM

## 2024-06-16 RX ORDER — POLYETHYLENE GLYCOL 3350 17 G/17G
17 POWDER, FOR SOLUTION ORAL DAILY PRN
Status: DISCONTINUED | OUTPATIENT
Start: 2024-06-16 | End: 2024-06-17 | Stop reason: HOSPADM

## 2024-06-16 RX ADMIN — SODIUM CHLORIDE: 9 INJECTION, SOLUTION INTRAVENOUS at 05:17

## 2024-06-16 RX ADMIN — SODIUM CHLORIDE, PRESERVATIVE FREE 10 ML: 5 INJECTION INTRAVENOUS at 21:00

## 2024-06-16 RX ADMIN — SODIUM CHLORIDE, PRESERVATIVE FREE 10 ML: 5 INJECTION INTRAVENOUS at 08:46

## 2024-06-16 RX ADMIN — IOPAMIDOL 100 ML: 755 INJECTION, SOLUTION INTRAVENOUS at 01:29

## 2024-06-16 RX ADMIN — DICYCLOMINE HYDROCHLORIDE 20 MG: 10 CAPSULE ORAL at 01:17

## 2024-06-16 RX ADMIN — SODIUM CHLORIDE: 9 INJECTION, SOLUTION INTRAVENOUS at 13:28

## 2024-06-16 RX ADMIN — SODIUM CHLORIDE 1000 ML: 9 INJECTION, SOLUTION INTRAVENOUS at 01:17

## 2024-06-16 RX ADMIN — ENOXAPARIN SODIUM 40 MG: 100 INJECTION SUBCUTANEOUS at 08:46

## 2024-06-16 ASSESSMENT — PAIN SCALES - GENERAL
PAINLEVEL_OUTOF10: 1
PAINLEVEL_OUTOF10: 9

## 2024-06-16 ASSESSMENT — PAIN DESCRIPTION - ORIENTATION: ORIENTATION: UPPER

## 2024-06-16 ASSESSMENT — PAIN - FUNCTIONAL ASSESSMENT: PAIN_FUNCTIONAL_ASSESSMENT: 0-10

## 2024-06-16 ASSESSMENT — LIFESTYLE VARIABLES
HOW OFTEN DO YOU HAVE A DRINK CONTAINING ALCOHOL: MONTHLY OR LESS
HOW MANY STANDARD DRINKS CONTAINING ALCOHOL DO YOU HAVE ON A TYPICAL DAY: 1 OR 2

## 2024-06-16 ASSESSMENT — PAIN DESCRIPTION - LOCATION: LOCATION: ABDOMEN

## 2024-06-16 NOTE — CARE COORDINATION
53 yr-old F with SBO.  Lives with spouse who can assist at home.  No needs.     06/16/24 1026   Service Assessment   Patient Orientation Alert and Oriented   Cognition Alert   History Provided By Patient   Primary Caregiver Self   Accompanied By/Relationship spouse   Support Systems Spouse/Significant Other   Patient's Healthcare Decision Maker is: Legal Next of Kin   PCP Verified by CM Yes   Last Visit to PCP Within last 6 months   Prior Functional Level Independent in ADLs/IADLs   Current Functional Level Independent in ADLs/IADLs   Can patient return to prior living arrangement Yes   Ability to make needs known: Good   Family able to assist with home care needs: Yes   Would you like for me to discuss the discharge plan with any other family members/significant others, and if so, who? No   Financial Resources Other (Comment)  ([employer])   Community Resources Other (Comment)  (n/a)

## 2024-06-16 NOTE — ED NOTES
TRANSFER - OUT REPORT:    Verbal report given to YASMINE Chatterjee on Walter Tijerina  being transferred to [place] [address] for routine progression of patient care       Report consisted of patient's Situation, Background, Assessment and   Recommendations(SBAR).     Information from the following report(s) Nurse Handoff Report was reviewed with the receiving nurse.    [place] Fall Assessment:    Presents to emergency department  because of falls (Syncope, seizure, or loss of consciousness): No  Age > 70: No  Altered Mental Status, Intoxication with alcohol or substance confusion (Disorientation, impaired judgment, poor safety awaremess, or inability to follow instructions): No  Impaired Mobility: Ambulates or transfers with assistive devices or assistance; Unable to ambulate or transer.: No  Nursing Judgement: No          Lines:   Peripheral IV 06/16/24 Right Antecubital (Active)        Opportunity for questions and clarification was provided.      Patient transported with:  Registered Nurse

## 2024-06-16 NOTE — ED TRIAGE NOTES
Pt ambulated to triage    Pt states she is experiencing abdominal pain in the upper quads.  Pt is experiencing n/v.  This started around 2100 yesterday.  No strange food or precipitating event.  No shortness of breath, chest pain , gerd, or numbness.  Pt has not taken any otc medication for this.

## 2024-06-16 NOTE — H&P
[facility] Hospitalist Initial History and Physical Note    Patient: Walter Tijerina Date: 6/16/2024  female, 53 y.o.  Admit Date: 6/16/2024  Attending: Slade Gleason MD     ASSESSMENT AND PLAN:     Principal Problem:    SBO (small bowel obstruction) (HCC)  Plan: Evident on Ct. NPO. IVF. IV pain/nausea control. General surgery consult.   Active Problems:    Leukocytosis  Plan: Follow CBC    Mixed hyperlipidemia  Plan: Stable. Continue home meds.          DVT Prophylaxis: Lovenox      Code Status: FULL CODE      Disposition: Admit to med/surg for evaluation and treatment as per above.      Anticipated discharge: 2-3 days     CHIEF COMPLAINT:  abdominal pain    HISTORY OF PRESENT ILLNESS:      Patient Active Problem List   Diagnosis    Traumatic tear of right rotator cuff    Chondromalacia of right shoulder    History of ovarian cancer    Strain of muscle, fascia and tendon of long head of biceps, right arm, initial encounter    Superior glenoid labrum lesion of right shoulder    Adhesive capsulitis of right shoulder    Degenerative joint disease of right acromioclavicular joint    Intractable periodic headache syndrome    Seasonal allergic rhinitis due to pollen    Mixed hyperlipidemia    Anxiety    Prediabetes    SBO (small bowel obstruction) (HCC)    Leukocytosis       Walter Tijerina is a 53 y.o. female, with a history of  has a past medical history of Anxiety, Burning with urination, Depression, Headache, History of mammogram, History of Papanicolaou smear of cervix, Hx of seasonal allergies, and Ovarian cancer (HCC).,  has a past surgical history that includes Breast biopsy (Left); Appendectomy (1990); Colonoscopy (N/A, 4/15/2019); ovarian cyst removal (Left, 1993, 1999); and Ovary removal (Left, 1990). who presents to the ER with report of severe lower abdominal pain after eating dinner last night. Pain as generalized and crampy. Somewhat improved since it started. 3 episodes of vomiting. No

## 2024-06-16 NOTE — ED PROVIDER NOTES
0.74 0.60 - 1.10 MG/DL    Est, Glom Filt Rate >90 >60 ml/min/1.73m2    Calcium 9.7 8.8 - 10.2 MG/DL    Total Bilirubin 0.2 0.0 - 1.2 MG/DL    ALT 35 12 - 65 U/L    AST 27 15 - 37 U/L    Alk Phosphatase 108 (H) 35 - 104 U/L    Total Protein 7.4 6.3 - 8.2 g/dL    Albumin 3.9 3.5 - 5.0 g/dL    Globulin 3.5 2.3 - 3.5 g/dL    Albumin/Globulin Ratio 1.1 1.0 - 1.9     Lipase   Result Value Ref Range    Lipase 28 13 - 60 U/L   Lactate, Sepsis (Select if patient is over 65 to rule out mesenteric ischemia)   Result Value Ref Range    Lactic Acid, Sepsis 1.5 0.5 - 2.0 MMOL/L         CT ABDOMEN PELVIS W IV CONTRAST Additional Contrast? None   Final Result   1.  There are dilated loops of small bowel with the distal small bowel   loops relatively decompressed.  This is likely due to small bowel   obstruction versus ileus.  A follow-up CT scan with oral contrast for   small bowel series would be helpful.   2.  The appendix is nonvisualized.  Although, there is no secondary   signs of appendicitis.         Automatic exposure control was used as a dose lowering technique.      Radiation Dose: CTDI is 12.17 mGy. DLP is 638.94 mGy-cm.      Contrast Type: iopamidol (ISOVUE-370) 76 . Contrast Volume: 100 mL      Report signed on 06/16/2024 (02:41 Eastern Time)   Signed by: Gordon Rodriguez M.D.   Reading Location: 396                   No results for input(s): \"COVID19\" in the last 72 hours.    Voice dictation software was used during the making of this note.  This software is not perfect and grammatical and other typographical errors may be present.  This note has not been completely proofread for errors.      Harsh Tolentino, DO  06/16/24 5978

## 2024-06-17 ENCOUNTER — APPOINTMENT (OUTPATIENT)
Dept: GENERAL RADIOLOGY | Age: 54
DRG: 390 | End: 2024-06-17
Payer: COMMERCIAL

## 2024-06-17 VITALS
HEART RATE: 76 BPM | DIASTOLIC BLOOD PRESSURE: 76 MMHG | SYSTOLIC BLOOD PRESSURE: 136 MMHG | TEMPERATURE: 98.2 F | WEIGHT: 174 LBS | OXYGEN SATURATION: 95 % | HEIGHT: 66 IN | BODY MASS INDEX: 27.97 KG/M2 | RESPIRATION RATE: 16 BRPM

## 2024-06-17 LAB
ALBUMIN SERPL-MCNC: 3.2 G/DL (ref 3.5–5)
ALBUMIN/GLOB SERPL: 1 (ref 1–1.9)
ALP SERPL-CCNC: 84 U/L (ref 35–104)
ALT SERPL-CCNC: 27 U/L (ref 12–65)
ANION GAP SERPL CALC-SCNC: 11 MMOL/L (ref 9–18)
AST SERPL-CCNC: 22 U/L (ref 15–37)
BASOPHILS # BLD: 0 K/UL (ref 0–0.2)
BASOPHILS NFR BLD: 0 % (ref 0–2)
BILIRUB SERPL-MCNC: 0.6 MG/DL (ref 0–1.2)
BUN SERPL-MCNC: 6 MG/DL (ref 6–23)
CALCIUM SERPL-MCNC: 8.6 MG/DL (ref 8.8–10.2)
CHLORIDE SERPL-SCNC: 105 MMOL/L (ref 98–107)
CO2 SERPL-SCNC: 23 MMOL/L (ref 20–28)
CREAT SERPL-MCNC: 0.51 MG/DL (ref 0.6–1.1)
DIFFERENTIAL METHOD BLD: ABNORMAL
EOSINOPHIL # BLD: 0.2 K/UL (ref 0–0.8)
EOSINOPHIL NFR BLD: 2 % (ref 0.5–7.8)
ERYTHROCYTE [DISTWIDTH] IN BLOOD BY AUTOMATED COUNT: 12.9 % (ref 11.9–14.6)
GLOBULIN SER CALC-MCNC: 3.1 G/DL (ref 2.3–3.5)
GLUCOSE SERPL-MCNC: 96 MG/DL (ref 70–99)
HCT VFR BLD AUTO: 38.8 % (ref 35.8–46.3)
HGB BLD-MCNC: 13.3 G/DL (ref 11.7–15.4)
IMM GRANULOCYTES # BLD AUTO: 0 K/UL (ref 0–0.5)
IMM GRANULOCYTES NFR BLD AUTO: 0 % (ref 0–5)
LYMPHOCYTES # BLD: 2.8 K/UL (ref 0.5–4.6)
LYMPHOCYTES NFR BLD: 29 % (ref 13–44)
MCH RBC QN AUTO: 28.7 PG (ref 26.1–32.9)
MCHC RBC AUTO-ENTMCNC: 34.3 G/DL (ref 31.4–35)
MCV RBC AUTO: 83.8 FL (ref 82–102)
MONOCYTES # BLD: 0.8 K/UL (ref 0.1–1.3)
MONOCYTES NFR BLD: 8 % (ref 4–12)
NEUTS SEG # BLD: 5.8 K/UL (ref 1.7–8.2)
NEUTS SEG NFR BLD: 61 % (ref 43–78)
NRBC # BLD: 0 K/UL (ref 0–0.2)
PLATELET # BLD AUTO: 341 K/UL (ref 150–450)
PMV BLD AUTO: 9.1 FL (ref 9.4–12.3)
POTASSIUM SERPL-SCNC: 3.9 MMOL/L (ref 3.5–5.1)
PROT SERPL-MCNC: 6.3 G/DL (ref 6.3–8.2)
RBC # BLD AUTO: 4.63 M/UL (ref 4.05–5.2)
SODIUM SERPL-SCNC: 139 MMOL/L (ref 136–145)
WBC # BLD AUTO: 9.6 K/UL (ref 4.3–11.1)

## 2024-06-17 PROCEDURE — 2580000003 HC RX 258: Performed by: FAMILY MEDICINE

## 2024-06-17 PROCEDURE — 6360000002 HC RX W HCPCS: Performed by: FAMILY MEDICINE

## 2024-06-17 PROCEDURE — 85025 COMPLETE CBC W/AUTO DIFF WBC: CPT

## 2024-06-17 PROCEDURE — 74018 RADEX ABDOMEN 1 VIEW: CPT

## 2024-06-17 PROCEDURE — 80053 COMPREHEN METABOLIC PANEL: CPT

## 2024-06-17 PROCEDURE — 36415 COLL VENOUS BLD VENIPUNCTURE: CPT

## 2024-06-17 RX ADMIN — ENOXAPARIN SODIUM 40 MG: 100 INJECTION SUBCUTANEOUS at 08:12

## 2024-06-17 RX ADMIN — SODIUM CHLORIDE: 9 INJECTION, SOLUTION INTRAVENOUS at 08:06

## 2024-06-17 NOTE — DISCHARGE SUMMARY
Hospitalist Discharge Summary   Admit Date:  2024 12:23 AM   DC Note date: 2024  Name:  Walter Tijerina   Age:  53 y.o.  Sex:  female  :  1970   MRN:  [id number]   Room:  [facility]  PCP:  Swathi See MD    Presenting Complaint: Abdominal Pain and Emesis     Initial Admission Diagnosis: SBO (small bowel obstruction) ([facility]) [K56.609]     Problem List for this Hospitalization (present on admission):    Principal Problem:    SBO (small bowel obstruction) ([facility])  Active Problems:    Mixed hyperlipidemia    Leukocytosis  Resolved Problems:    * No resolved hospital problems. *      Hospital Course:  53 y.o. female with no significant medical history who presented with upper quadrant abdominal pain of 1 day duration after eating dinner associated with nausea and vomiting of sudden onset.     In ED, VSS.  WBC 18.1.  CTAP shows dilated loops of small bowel likely due to small bowel obstruction versus ileus; appendix is nonvisualized but no secondary signs of appendicitis.  NG tube was placed in the ED and general surgery was consulted.     Patient was admitted with SBO.  Supportive care initiated.  General surgery was consulted.  Repeat KUB on  shows no SBO so NG tube was removed.  Diet was advanced and patient tolerated p.o. well.  She reported resolution of abdominal pain, nausea, vomiting.  She reported 2 bowel movements and overall feels so much better.  Discussed with general surgery Dr. Vargas on  and he was okay for patient to be discharged at this point.  She was discharged in medically stable condition.  Discussed with her to stay on GI bland soft diet for 1 week and better to do small frequent meals throughout the day.  Discussed with her the importance of staying hydrated.  She will need to follow-up with her PCP in 3-5 days.  Return precautions given and patient verbalized understanding.    Disposition: Home  Diet: ADULT DIET; Regular; Low Fat (less than or equal to 50

## 2024-06-17 NOTE — DISCHARGE INSTRUCTIONS
Please stay on soft bland diet for 1 week     Soft-Textured, [place] Diet: Care Instructions  Your Care Instructions     A soft-textured, bland diet is used when you need food that is easy to chew, swallow, and digest. You will need to choose soft foods that are low in spices and seasonings. You will need to avoid high-fat foods, as well as caffeine and alcohol.  Your doctor or dietitian can help you plan a soft-textured, bland diet based on your health and what you prefer to eat. Ask your doctor how long you should stay on this diet. As you get better, you will probably be able to go back to a regular diet. Talk with your doctor or dietitian before you make changes in your diet.  Follow-up care is a key part of your treatment and safety. Be sure to make and go to all appointments, and call your doctor if you are having problems. It's also a good idea to know your test results and keep a list of the medicines you take.  How can you care for yourself at home?  Choose foods that are easy to chew and swallow. Good choices are mashed potatoes, soft breads and rolls, cream soups, oatmeal, and Cream of Wheat.  Choose soft, well-cooked vegetables and soft or canned fruits. Good choices are applesauce, ripe bananas, and non-citrus fruit juice.  Try milk, yogurt, or other milk products, if you can digest dairy without too many problems. Your doctor may limit milk and milk products for a while. If so, he or she may recommend a calcium and vitamin D supplement.  Choose soft protein foods such as eggs, tofu, steamed fish, chicken, and turkey. Slow-cooking methods, such as stewing, will help soften meat. Chopping meat in a  or  also will make it easier to eat.  Avoid nuts, raw vegetables, hard crackers, tough meats, and prunes and prune juice.  Avoid foods that are very spicy, such as foods seasoned with black pepper, chili peppers, horseradish, or hot sauce.  Avoid highly acidic foods such as citrus fruits,

## 2024-06-17 NOTE — PROGRESS NOTES
Hospitalist Progress Note   Admit Date:  2024 12:23 AM   Name:  Walter Tijerina   Age:  53 y.o.  Sex:  female  :  1970   MRN:  [id number]   Room:  [facility]/    Presenting/Chief Complaint: Abdominal Pain and Emesis     Reason(s) for Admission: SBO (small bowel obstruction) ([facility]) [K56.609]     Hospital Course:   Walter Tijerina is a 53 y.o. female with no significant medical history who presented with upper quadrant abdominal pain of 1 day duration after eating dinner associated with nausea and vomiting of sudden onset.    In ED, VSS.  WBC 18.1.  CTAP shows dilated loops of small bowel likely due to small bowel obstruction versus ileus; appendix is nonvisualized but no secondary signs of appendicitis.  NG tube was placed in the ED and general surgery was consulted.    Patient was admitted with SBO.  Supportive care initiated.    Subjective & 24hr Events:     Patient alert and orient x 3.   at bedside.  NG tube in place.  Denies any nausea vomiting.  Abdominal pain improved.  Has been passing gas but no bowel movements.  No fever or chills.      Assessment & Plan:     Small Bowel Obstruction  CTAP shows dilated loops of small bowel likely due to small bowel obstruction versus ileus; appendix is nonvisualized but no secondary signs of appendicitis.    NGT placed in ED.   Cont supportive care  NPO except meds/ice chips  NGT to LIS  MIVF  Add analgesics prn--Roxicodone prn moderate , morphine prn severe pain  Antiemetics prn  General surgery on board, appreciate recs    Leukocytosis  Most likely reactive response in setting of SBO.  Did not meet criteria for sepsis on admission  Hold off on antibiotics or further infectious workup at this time unless febrile or worsening leukocytosis  CTM CBC      Anticipated Discharge Arrangements:   Anticipate discharge in 2-3 days pending resolution of SBO and improvement of p.o. intake.  Follow-up surgery recs.      PT/OT evals ordered?  Not 
     [facility]  [address] [place] [address]  Phone: [phone]             June 17, 2024    Patient: Walter Tijerina   YOB: 1970   Date of Visit: 6/16/2024       To Whom It May Concern:    Walter Tijerina was seen and treated in our facility  beginning 6/16/2024 until 6/17/20204. She may return to work on 06/19/2024 .      Sincerely,       Taty Strickland RN         Signature:__________________________________         
General Surgery consult made to Kim Frommel for partial SBO.  
KUB reviewed/ discussed with Dr Vargas.    6/17/24 KUB  FINDINGS: The appearance of the small bowel is improving. There is no evidence  of obstruction.  No renal calculi are seen. Lung bases are clear. The  nasogastric tube tip and sideport are in the area of the stomach. There are  multiple surgical clips in the pelvis.     IMPRESSION:  There is no evidence of small bowel obstruction.    Plan  Ok to remove NG tube and ADAT.       Kimberly Frommel, NP  
Range    WBC 9.6 4.3 - 11.1 K/uL    RBC 4.63 4.05 - 5.2 M/uL    Hemoglobin 13.3 11.7 - 15.4 g/dL    Hematocrit 38.8 35.8 - 46.3 %    MCV 83.8 82.0 - 102.0 FL    MCH 28.7 26.1 - 32.9 PG    MCHC 34.3 31.4 - 35.0 g/dL    RDW 12.9 11.9 - 14.6 %    Platelets 341 150 - 450 K/uL    MPV 9.1 (L) 9.4 - 12.3 FL    nRBC 0.00 0.0 - 0.2 K/uL    Differential Type AUTOMATED      Neutrophils % 61 43 - 78 %    Lymphocytes % 29 13 - 44 %    Monocytes % 8 4.0 - 12.0 %    Eosinophils % 2 0.5 - 7.8 %    Basophils % 0 0.0 - 2.0 %    Immature Granulocytes % 0 0.0 - 5.0 %    Neutrophils Absolute 5.8 1.7 - 8.2 K/UL    Lymphocytes Absolute 2.8 0.5 - 4.6 K/UL    Monocytes Absolute 0.8 0.1 - 1.3 K/UL    Eosinophils Absolute 0.2 0.0 - 0.8 K/UL    Basophils Absolute 0.0 0.0 - 0.2 K/UL    Immature Granulocytes Absolute 0.0 0.0 - 0.5 K/UL       No results for input(s): \"COVID19\" in the last 72 hours.    Current Meds:  Current Facility-Administered Medications   Medication Dose Route Frequency    sodium chloride flush 0.9 % injection 5-40 mL  5-40 mL IntraVENous 2 times per day    sodium chloride flush 0.9 % injection 5-40 mL  5-40 mL IntraVENous PRN    0.9 % sodium chloride infusion   IntraVENous PRN    potassium chloride (KLOR-CON M) extended release tablet 40 mEq  40 mEq Oral PRN    Or    potassium bicarb-citric acid (EFFER-K) effervescent tablet 40 mEq  40 mEq Oral PRN    Or    potassium chloride 10 mEq/100 mL IVPB (Peripheral Line)  10 mEq IntraVENous PRN    magnesium sulfate 2000 mg in 50 mL IVPB premix  2,000 mg IntraVENous PRN    ondansetron (ZOFRAN-ODT) disintegrating tablet 4 mg  4 mg Oral Q8H PRN    Or    ondansetron (ZOFRAN) injection 4 mg  4 mg IntraVENous Q6H PRN    polyethylene glycol (GLYCOLAX) packet 17 g  17 g Oral Daily PRN    acetaminophen (TYLENOL) tablet 650 mg  650 mg Oral Q6H PRN    Or    acetaminophen (TYLENOL) suppository 650 mg  650 mg Rectal Q6H PRN    0.9 % sodium chloride infusion   IntraVENous Continuous    enoxaparin

## 2024-06-17 NOTE — ICUWATCH
[facility] Clinical Rounding Nurse Assistance    Called to assist primary RN with IV start. #22 placed in right FA. Line flushes well with blood return.    Alina Joseph RN  [facility]: [phone]  [facility]: [phone]

## 2024-06-17 NOTE — CARE COORDINATION
Patient with discharge orders for today. No additional needs made known to CM. Patient has met all treatment goals and milestones for discharge. Family to provide transportation home. CM following until patient is discharged.        06/17/24 1517   Services At/After Discharge   Transition of Care Consult (CM Consult) N/A   Services At/After Discharge None    Resource Information Provided? No   Mode of Transport at Discharge Other (see comment)  (Family)   Confirm Follow Up Transport Family   Condition of Participation: Discharge Planning   The Plan for Transition of Care is related to the following treatment goals: Patient to return to baseline level of function with assist of family   The Patient and/or Patient Representative was provided with a Choice of Provider? Patient   The Patient and/Or Patient Representative agree with the Discharge Plan? Yes   Freedom of Choice list was provided with basic dialogue that supports the patient's individualized plan of care/goals, treatment preferences, and shares the quality data associated with the providers?  Yes

## 2024-06-17 NOTE — CARE COORDINATION
Chart reviewed by RNCM and discussed in IDR     CM following for continued stay.    NG tube out, patient tolerating clear liquids     Possible DC later today or tomorrow.     Anticipate patient to go home with no additional needs.     Please consult case management if any additional discharge needs arise.

## 2024-06-17 NOTE — CONSULTS
History of ovarian cancer 02/20/2019     1990, s/p R oophorectomy, chemotherapy        Intractable periodic headache syndrome 02/20/2019    Seasonal allergic rhinitis due to pollen 02/20/2019    Mixed hyperlipidemia 02/20/2019    Anxiety 02/20/2019        - Review KUB.   - Clamp NG tube and trial a clear liquid diet.   - Refer to hospitalist for further medical management.   - Continue pain medicine PRN.  - Call with any questions or concerns.       Signed: JESUS Gonzalez  6/17/2024    8:17 AM

## 2024-06-18 ENCOUNTER — TELEPHONE (OUTPATIENT)
Dept: INTERNAL MEDICINE CLINIC | Facility: CLINIC | Age: 54
End: 2024-06-18

## 2024-06-19 ENCOUNTER — TELEPHONE (OUTPATIENT)
Dept: INTERNAL MEDICINE CLINIC | Facility: CLINIC | Age: 54
End: 2024-06-19

## 2024-06-24 ENCOUNTER — OFFICE VISIT (OUTPATIENT)
Dept: INTERNAL MEDICINE CLINIC | Facility: CLINIC | Age: 54
End: 2024-06-24

## 2024-06-24 VITALS
HEIGHT: 66 IN | BODY MASS INDEX: 27.64 KG/M2 | TEMPERATURE: 97.3 F | HEART RATE: 69 BPM | WEIGHT: 172 LBS | DIASTOLIC BLOOD PRESSURE: 80 MMHG | SYSTOLIC BLOOD PRESSURE: 116 MMHG | OXYGEN SATURATION: 97 %

## 2024-06-24 DIAGNOSIS — K56.609 SMALL BOWEL OBSTRUCTION (HCC): ICD-10-CM

## 2024-06-24 DIAGNOSIS — Z09 HOSPITAL DISCHARGE FOLLOW-UP: Primary | ICD-10-CM

## 2024-06-24 ASSESSMENT — ENCOUNTER SYMPTOMS
COUGH: 0
SHORTNESS OF BREATH: 0
WHEEZING: 0
VOMITING: 0
ABDOMINAL PAIN: 0
NAUSEA: 0
SORE THROAT: 0

## 2024-06-24 NOTE — PROGRESS NOTES
Post-Discharge Transitional Care Follow Up      Walter Tijerina   YOB: 1970    Date of Office Visit:  6/24/2024  Date of Hospital Admission: 6/16/24  Date of Hospital Discharge: 6/17/24  Readmission Risk Score (high >=14%. Medium >=10%):Readmission Risk Score: 6      Care management risk score Rising risk (score 2-5) and Complex Care (Scores >=6): No Risk Score On File     Non face to face  following discharge, date last encounter closed (first attempt may have been earlier): 06/18/2024     Call initiated 2 business days of discharge: Yes     Hospital discharge follow-up  -     MS DISCHARGE MEDS RECONCILED W/ CURRENT OUTPATIENT MED LIST    Small bowel obstruction (HCC)  No further abdominal pain or N/V. Advancing diet slowly and tolerating well. Call for any recurrent symptoms.       Medical Decision Making: moderate complexity  Return in 7 weeks (on 8/15/2024) for next scheduled routine follow-up or sooner if needed.    On this date 6/24/2024 I have spent 35 minutes reviewing previous notes, test results and face to face with the patient discussing the diagnosis and importance of compliance with the treatment plan as well as documenting on the day of the visit.       Subjective:   HPI    Inpatient course: Discharge summary reviewed- see chart.    Interval history/Current status:     Hospital Course:  53 y.o. female with no significant medical history who presented with upper quadrant abdominal pain of 1 day duration after eating dinner associated with nausea and vomiting of sudden onset.     In ED, VSS.  WBC 18.1.  CTAP shows dilated loops of small bowel likely due to small bowel obstruction versus ileus; appendix is nonvisualized but no secondary signs of appendicitis.  NG tube was placed in the ED, and general surgery was consulted.     Patient was admitted with SBO.  Supportive care initiated.  General surgery was consulted.  Repeat KUB on 6/17 shows no SBO, so NG tube was removed.  Diet

## 2024-07-09 NOTE — PROGRESS NOTES
Paternal Uncle     Diabetes Paternal Uncle     Stomach Cancer Paternal Uncle     Arthritis Maternal Grandmother             Breast Cancer Neg Hx       Social History     Tobacco Use    Smoking status: Never    Smokeless tobacco: Never   Substance Use Topics    Alcohol use: Not Currently     Alcohol/week: 1.0 standard drink of alcohol     Types: 1 Glasses of wine per week     Comment: Every once in a while special occasion only       Social History     Substance and Sexual Activity   Sexual Activity Not Currently    Partners: Male     OB History    Para Term  AB Living   0 0 0 0 0 0   SAB IAB Ectopic Molar Multiple Live Births   0 0 0 0 0 0       Health Maintenance  Mammogram: 23; 24 scheduled   Colonoscopy: 2019  Bone Density:  Pap smear: 7/10/23      Review of Systems  General: Not Present- Chills, Fever, Fatigue, Insomnia, Hot flashes/Night sweats, Weight gain  Skin: Not Present- Bruising, Change in Wart/Mole, Excessive Sweating, Itching, Nail Changes, New Lesions, Rash, Skin Color Changes and Ulcer.  HEENT: Not Present- Headache, Blurred Vision, Double Vision, Glaucoma, Visual Disturbances, Hearing Loss, Ringing in the Ears, Vertigo, Nose Bleed, Bleeding Gums, Hoarseness and Sore Throat.  Neck: Not Present- Neck Pain and Neck Swelling.  Respiratory: Not Present- Cough, Difficulty Breathing and Difficulty Breathing on Exertion.  Breast: Not Present- Breast Mass, Breast Pain, Breast Swelling, Nipple Discharge, Nipple Pain, Recent Breast Size Changes and Skin Changes.  Cardiovascular: Not Present- Abnormal Blood Pressure, Chest Pain, Edema, Fainting / Blacking Out, Palpitations, Shortness of Breath and Swelling of Extremities.  Gastrointestinal: Not Present- Abdominal Pain, Abdominal Swelling, Bloating, Change in Bowel Habits, Constipation, Diarrhea, Difficulty Swallowing, Gets full quickly at meals, Nausea, Rectal Bleeding and Vomiting.  Female Genitourinary: Not Present- Dysmenorrhea,

## 2024-07-15 ENCOUNTER — OFFICE VISIT (OUTPATIENT)
Dept: OBGYN CLINIC | Age: 54
End: 2024-07-15
Payer: COMMERCIAL

## 2024-07-15 VITALS
SYSTOLIC BLOOD PRESSURE: 118 MMHG | WEIGHT: 173 LBS | DIASTOLIC BLOOD PRESSURE: 70 MMHG | BODY MASS INDEX: 27.8 KG/M2 | HEIGHT: 66 IN

## 2024-07-15 DIAGNOSIS — Z01.419 ENCOUNTER FOR WELL WOMAN EXAM WITH ROUTINE GYNECOLOGICAL EXAM: Primary | ICD-10-CM

## 2024-07-15 DIAGNOSIS — Z12.4 CERVICAL CANCER SCREENING: ICD-10-CM

## 2024-07-15 PROCEDURE — 99396 PREV VISIT EST AGE 40-64: CPT | Performed by: OBSTETRICS & GYNECOLOGY

## 2024-07-15 PROCEDURE — 99459 PELVIC EXAMINATION: CPT | Performed by: OBSTETRICS & GYNECOLOGY

## 2024-07-16 LAB
COLLECTION METHOD: NORMAL
CYTOLOGIST CVX/VAG CYTO: NORMAL
CYTOLOGY CVX/VAG DOC THIN PREP: NORMAL
DATE OF LMP: NORMAL
HPV REFLEX: NORMAL
Lab: NORMAL
OTHER PT INFO: NORMAL
PAP SOURCE: NORMAL
PATH REPORT.FINAL DX SPEC: NORMAL
PREV TREATMENT: NORMAL
STAT OF ADQ CVX/VAG CYTO-IMP: NORMAL

## 2024-08-07 ENCOUNTER — HOSPITAL ENCOUNTER (OUTPATIENT)
Dept: MAMMOGRAPHY | Age: 54
Discharge: HOME | End: 2024-08-10
Attending: OBSTETRICS & GYNECOLOGY
Payer: COMMERCIAL

## 2024-08-07 DIAGNOSIS — Z12.31 SCREENING MAMMOGRAM FOR HIGH-RISK PATIENT: ICD-10-CM

## 2024-08-07 PROCEDURE — 77067 SCR MAMMO BI INCL CAD: CPT

## 2024-08-09 ENCOUNTER — LAB (OUTPATIENT)
Dept: INTERNAL MEDICINE CLINIC | Facility: CLINIC | Age: 54
End: 2024-08-09

## 2024-08-09 DIAGNOSIS — R73.03 PREDIABETES: ICD-10-CM

## 2024-08-09 DIAGNOSIS — E55.9 VITAMIN D INSUFFICIENCY: ICD-10-CM

## 2024-08-09 DIAGNOSIS — Z11.59 ENCOUNTER FOR HEPATITIS C SCREENING TEST FOR LOW RISK PATIENT: ICD-10-CM

## 2024-08-09 DIAGNOSIS — Z11.4 ENCOUNTER FOR SCREENING FOR HIV: ICD-10-CM

## 2024-08-09 DIAGNOSIS — E78.2 MIXED HYPERLIPIDEMIA: ICD-10-CM

## 2024-08-09 LAB
25(OH)D3 SERPL-MCNC: 28.8 NG/ML (ref 30–100)
ALBUMIN SERPL-MCNC: 3.8 G/DL (ref 3.5–5)
ALBUMIN/GLOB SERPL: 1.2 (ref 1–1.9)
ALP SERPL-CCNC: 103 U/L (ref 35–104)
ALT SERPL-CCNC: 34 U/L (ref 12–65)
ANION GAP SERPL CALC-SCNC: 11 MMOL/L (ref 9–18)
APPEARANCE UR: ABNORMAL
AST SERPL-CCNC: 29 U/L (ref 15–37)
BACTERIA URNS QL MICRO: NEGATIVE /HPF
BASOPHILS # BLD: 0.1 K/UL (ref 0–0.2)
BASOPHILS NFR BLD: 1 % (ref 0–2)
BILIRUB SERPL-MCNC: 0.5 MG/DL (ref 0–1.2)
BILIRUB UR QL: NEGATIVE
BUN SERPL-MCNC: 11 MG/DL (ref 6–23)
CALCIUM SERPL-MCNC: 9.6 MG/DL (ref 8.8–10.2)
CHLORIDE SERPL-SCNC: 103 MMOL/L (ref 98–107)
CHOLEST SERPL-MCNC: 242 MG/DL (ref 0–200)
CO2 SERPL-SCNC: 27 MMOL/L (ref 20–28)
COLOR UR: ABNORMAL
CREAT SERPL-MCNC: 0.62 MG/DL (ref 0.6–1.1)
DIFFERENTIAL METHOD BLD: NORMAL
EOSINOPHIL # BLD: 0.1 K/UL (ref 0–0.8)
EOSINOPHIL NFR BLD: 2 % (ref 0.5–7.8)
EPI CELLS #/AREA URNS HPF: ABNORMAL /HPF (ref 0–5)
ERYTHROCYTE [DISTWIDTH] IN BLOOD BY AUTOMATED COUNT: 13.1 % (ref 11.9–14.6)
EST. AVERAGE GLUCOSE BLD GHB EST-MCNC: 134 MG/DL
GLOBULIN SER CALC-MCNC: 3.3 G/DL (ref 2.3–3.5)
GLUCOSE SERPL-MCNC: 103 MG/DL (ref 70–99)
GLUCOSE UR STRIP.AUTO-MCNC: NEGATIVE MG/DL
HBA1C MFR BLD: 6.3 % (ref 0–5.6)
HCT VFR BLD AUTO: 41.3 % (ref 35.8–46.3)
HCV AB SER QL: NONREACTIVE
HDLC SERPL-MCNC: 64 MG/DL (ref 40–60)
HDLC SERPL: 3.8 (ref 0–5)
HGB BLD-MCNC: 13.4 G/DL (ref 11.7–15.4)
HGB UR QL STRIP: ABNORMAL
HIV 1+2 AB+HIV1 P24 AG SERPL QL IA: NONREACTIVE
HIV 1/2 RESULT COMMENT: NORMAL
HYALINE CASTS URNS QL MICRO: ABNORMAL /LPF
IMM GRANULOCYTES # BLD AUTO: 0 K/UL (ref 0–0.5)
IMM GRANULOCYTES NFR BLD AUTO: 0 % (ref 0–5)
KETONES UR QL STRIP.AUTO: ABNORMAL MG/DL
LDLC SERPL CALC-MCNC: 157 MG/DL (ref 0–100)
LEUKOCYTE ESTERASE UR QL STRIP.AUTO: NEGATIVE
LYMPHOCYTES # BLD: 2 K/UL (ref 0.5–4.6)
LYMPHOCYTES NFR BLD: 38 % (ref 13–44)
MCH RBC QN AUTO: 28.2 PG (ref 26.1–32.9)
MCHC RBC AUTO-ENTMCNC: 32.4 G/DL (ref 31.4–35)
MCV RBC AUTO: 86.9 FL (ref 82–102)
MONOCYTES # BLD: 0.4 K/UL (ref 0.1–1.3)
MONOCYTES NFR BLD: 8 % (ref 4–12)
NEUTS SEG # BLD: 2.7 K/UL (ref 1.7–8.2)
NEUTS SEG NFR BLD: 51 % (ref 43–78)
NITRITE UR QL STRIP.AUTO: NEGATIVE
NRBC # BLD: 0 K/UL (ref 0–0.2)
PH UR STRIP: 5.5 (ref 5–9)
PLATELET # BLD AUTO: 314 K/UL (ref 150–450)
PMV BLD AUTO: 10.2 FL (ref 9.4–12.3)
POTASSIUM SERPL-SCNC: 4.3 MMOL/L (ref 3.5–5.1)
PROT SERPL-MCNC: 7.1 G/DL (ref 6.3–8.2)
PROT UR STRIP-MCNC: NEGATIVE MG/DL
RBC # BLD AUTO: 4.75 M/UL (ref 4.05–5.2)
RBC #/AREA URNS HPF: ABNORMAL /HPF (ref 0–5)
SODIUM SERPL-SCNC: 140 MMOL/L (ref 136–145)
SP GR UR REFRACTOMETRY: 1.02 (ref 1–1.02)
T4 FREE SERPL-MCNC: 1.1 NG/DL (ref 0.9–1.7)
TRIGL SERPL-MCNC: 106 MG/DL (ref 0–150)
TSH, 3RD GENERATION: 1.01 UIU/ML (ref 0.27–4.2)
UROBILINOGEN UR QL STRIP.AUTO: 1 EU/DL (ref 0.2–1)
VLDLC SERPL CALC-MCNC: 21 MG/DL (ref 6–23)
WBC # BLD AUTO: 5.3 K/UL (ref 4.3–11.1)
WBC URNS QL MICRO: ABNORMAL /HPF (ref 0–4)

## 2024-08-15 ENCOUNTER — OFFICE VISIT (OUTPATIENT)
Dept: INTERNAL MEDICINE CLINIC | Facility: CLINIC | Age: 54
End: 2024-08-15
Payer: COMMERCIAL

## 2024-08-15 VITALS
WEIGHT: 172.8 LBS | BODY MASS INDEX: 27.77 KG/M2 | DIASTOLIC BLOOD PRESSURE: 54 MMHG | OXYGEN SATURATION: 98 % | SYSTOLIC BLOOD PRESSURE: 90 MMHG | HEIGHT: 66 IN | HEART RATE: 76 BPM | TEMPERATURE: 98.4 F | RESPIRATION RATE: 16 BRPM

## 2024-08-15 DIAGNOSIS — E78.2 MIXED HYPERLIPIDEMIA: ICD-10-CM

## 2024-08-15 DIAGNOSIS — Z85.43 HISTORY OF OVARIAN CANCER: ICD-10-CM

## 2024-08-15 DIAGNOSIS — Z01.419 WELL WOMAN EXAM: Primary | ICD-10-CM

## 2024-08-15 DIAGNOSIS — Z87.19 HISTORY OF SMALL BOWEL OBSTRUCTION: ICD-10-CM

## 2024-08-15 DIAGNOSIS — R73.03 PREDIABETES: ICD-10-CM

## 2024-08-15 DIAGNOSIS — R10.10 UPPER ABDOMINAL PAIN: ICD-10-CM

## 2024-08-15 DIAGNOSIS — E55.9 VITAMIN D DEFICIENCY: ICD-10-CM

## 2024-08-15 PROBLEM — K56.609 SBO (SMALL BOWEL OBSTRUCTION) (HCC): Status: RESOLVED | Noted: 2024-06-16 | Resolved: 2024-08-15

## 2024-08-15 PROCEDURE — 99396 PREV VISIT EST AGE 40-64: CPT | Performed by: INTERNAL MEDICINE

## 2024-08-15 RX ORDER — ERGOCALCIFEROL 1.25 MG/1
50000 CAPSULE ORAL WEEKLY
Qty: 12 CAPSULE | Refills: 1 | Status: SHIPPED | OUTPATIENT
Start: 2024-08-15

## 2024-08-15 ASSESSMENT — ENCOUNTER SYMPTOMS
COUGH: 0
NAUSEA: 0
SORE THROAT: 0
CHEST TIGHTNESS: 0
RHINORRHEA: 0
TROUBLE SWALLOWING: 0
VOMITING: 0
BLOOD IN STOOL: 0
SINUS PRESSURE: 0
SHORTNESS OF BREATH: 0
ABDOMINAL PAIN: 1
SINUS PAIN: 0
ALLERGIC/IMMUNOLOGIC NEGATIVE: 1
FACIAL SWELLING: 0
EYES NEGATIVE: 1
VOICE CHANGE: 0
DIARRHEA: 0
RESPIRATORY NEGATIVE: 1

## 2024-08-15 ASSESSMENT — PATIENT HEALTH QUESTIONNAIRE - PHQ9
SUM OF ALL RESPONSES TO PHQ9 QUESTIONS 1 & 2: 0
SUM OF ALL RESPONSES TO PHQ QUESTIONS 1-9: 0
1. LITTLE INTEREST OR PLEASURE IN DOING THINGS: NOT AT ALL
SUM OF ALL RESPONSES TO PHQ QUESTIONS 1-9: 0
2. FEELING DOWN, DEPRESSED OR HOPELESS: NOT AT ALL
SUM OF ALL RESPONSES TO PHQ QUESTIONS 1-9: 0
SUM OF ALL RESPONSES TO PHQ QUESTIONS 1-9: 0

## 2024-08-15 NOTE — PROGRESS NOTES
[facility]      8/15/[facility]24    Patient Name: Walter Tijerina  :  1970    Subjective:    Chief Complaint:  Chief Complaint   Patient presents with    Annual Exam     Pt is here for a CPX and to review labs.          HPI Here for f/u visit; patient had fasting labs done recently and results were reviewed in detail today;   She has hx of ovarian cancer, s/p L oophorectomy; she saw Ob/Gyn last month, had normal pap smear at that time; records reviewed;   She was hospitalized in  for small bowel obstruction, wheic resolved; she has occasional RUQ abdominal pain; she has occasional nausea; she is eating and drinking well, has daily bowel movements; denies blood in stool or black tarry stools; she had CT abd/pelv 24, and was compared to US 3/22/24; no gallstones or ductal dilation; f/u CT scan with oral contrast for small bowel series recommended; she is agreeable to proceed w/ f/u CT; .lcbrad  Mammogram: 2024  Pap smear: 2024  Bone density:  Colonoscopy: 2019  Eye exam: 10/2023  Dental exam: 2024  Pneumovax 23:  Prevnar 13:  Shingrix: 2019  Tdap: 2019  Fluvax:  Moderna Covid 19: , 3/17/21    Past Medical History:   Diagnosis Date    Anxiety     Burning with urination     history-resolved     Depression     Drug effect 111/10/1990    Headache     History of mammogram 2020    History of Papanicolaou smear of cervix     Dr. Carlton    Hx of seasonal allergies     Ovarian cancer (HCC)     Left ovary removed       Past Surgical History:   Procedure Laterality Date    APPENDECTOMY  1990    BREAST BIOPSY Left     Benign per pt    COLONOSCOPY N/A 4/15/2019    COLONOSCOPY/ 28 performed by Megan Stacy MD at [facility]    OVARIAN CYST REMOVAL Left ,     OVARY REMOVAL Left        Family History   Problem Relation Age of Onset    Osteoarthritis Mother     Asthma Mother     Diabetes Father     Heart Disease Father     Stroke Paternal Uncle

## 2024-08-27 ENCOUNTER — HOSPITAL ENCOUNTER (OUTPATIENT)
Dept: CT IMAGING | Age: 54
Discharge: HOME | End: 2024-08-30
Attending: INTERNAL MEDICINE
Payer: COMMERCIAL

## 2024-08-27 DIAGNOSIS — R10.10 UPPER ABDOMINAL PAIN: ICD-10-CM

## 2024-08-27 DIAGNOSIS — Z87.19 HISTORY OF SMALL BOWEL OBSTRUCTION: ICD-10-CM

## 2024-08-27 PROCEDURE — 6360000004 HC RX CONTRAST MEDICATION: Performed by: INTERNAL MEDICINE

## 2024-08-27 PROCEDURE — 74177 CT ABD & PELVIS W/CONTRAST: CPT

## 2024-08-27 RX ORDER — IOPAMIDOL 755 MG/ML
100 INJECTION, SOLUTION INTRAVASCULAR
Status: COMPLETED | OUTPATIENT
Start: 2024-08-27 | End: 2024-08-27

## 2024-08-27 RX ADMIN — DIATRIZOATE MEGLUMINE AND DIATRIZOATE SODIUM 15 ML: 660; 100 LIQUID ORAL; RECTAL at 09:04

## 2024-08-27 RX ADMIN — IOPAMIDOL 100 ML: 755 INJECTION, SOLUTION INTRAVENOUS at 09:04

## 2024-09-18 ENCOUNTER — PROCEDURE VISIT (OUTPATIENT)
Dept: NEUROLOGY | Age: 54
End: 2024-09-18

## 2024-09-18 VITALS — WEIGHT: 168 LBS | OXYGEN SATURATION: 93 % | HEART RATE: 73 BPM | BODY MASS INDEX: 27.12 KG/M2

## 2024-09-18 DIAGNOSIS — G56.03 CARPAL TUNNEL SYNDROME ON BOTH SIDES: ICD-10-CM

## 2024-09-18 DIAGNOSIS — R20.2 PARESTHESIA OF BOTH HANDS: Primary | ICD-10-CM

## 2024-12-18 ENCOUNTER — PROCEDURE VISIT (OUTPATIENT)
Dept: NEUROLOGY | Age: 54
End: 2024-12-18
Payer: COMMERCIAL

## 2024-12-18 VITALS — HEIGHT: 66 IN | HEART RATE: 85 BPM | OXYGEN SATURATION: 95 % | BODY MASS INDEX: 27.12 KG/M2

## 2024-12-18 DIAGNOSIS — R20.2 PARESTHESIA OF BOTH FEET: Primary | ICD-10-CM

## 2024-12-18 PROCEDURE — 95885 MUSC TST DONE W/NERV TST LIM: CPT | Performed by: PSYCHIATRY & NEUROLOGY

## 2024-12-18 PROCEDURE — 95911 NRV CNDJ TEST 9-10 STUDIES: CPT | Performed by: PSYCHIATRY & NEUROLOGY

## 2024-12-18 NOTE — PROGRESS NOTES
EMG/Nerve Conduction Study Procedure Note  [address]    [address]  [place]  [address]   [phone]      Hx:    Exam:     54 y.o.  M H/L female     EMG::    BLE... Paresthesia at the feet.  No atrophy.  No Babinski.  Referring:    Dr Swathi See  Technologist ::   Jose Cox  Hgt:      5'6\"        Summary    needle EMG selected lower extremity muscles with CV studies.                  Controlled environmental factors / EMG lab.  Temperature.   NCV : sensory segments:      Normal bilateral sural SCV.      NCV plantar sensory segments:    Markedly abnormal = = ABSENT /no response of bilateral plantar SCV SNAP.  NCV Motor MCV segments:    Normal bilateral tibial and basically normal bilateral peroneal with accessory peroneal variant.         F-wave studies:   Normal bilateral tibial and peroneal F-wave latencies.        H-REFLEX Studies:    Normal bilateral H-reflexes.   NEEDLE EMG:   Tested muscles::     Normal bilateral TA MG VL muscles.  No fibrillation positive sharp waves etc.               INTERPRETATION:     ELECTROPHYSIOLOGIC EVIDENCE ONLY OF DISTAL SENSORY PLANTAR NEUROPATHY.  THIS COULD BE EARLY SENSORY NEUROPATHY OR SENSORIMOTOR NEUROPATHY.  ISOLATED.  NO AXONAL DENERVATION.            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          Procedure Details:       Electrophysiologic suggestive evidence of a sensory neuropathy but without other definitive abnormalities.  Further clinical correlation is warranted.  Certainly such disorders as early diabetic neuropathy or other endocrinopathy neuropathies possible.    Patient made aware.            Please Note::     Data and waveforms * filed under Procedure.    See Procedure Files for data pages.       [ *NOTE:  parts or all of this report are produced using artificial voice recognition software.  Some speech errors are inherent in such software and may be included in the produced record.

## 2025-07-13 SDOH — ECONOMIC STABILITY: TRANSPORTATION INSECURITY
IN THE PAST 12 MONTHS, HAS THE LACK OF TRANSPORTATION KEPT YOU FROM MEDICAL APPOINTMENTS OR FROM GETTING MEDICATIONS?: NO

## 2025-07-13 SDOH — ECONOMIC STABILITY: FOOD INSECURITY: WITHIN THE PAST 12 MONTHS, YOU WORRIED THAT YOUR FOOD WOULD RUN OUT BEFORE YOU GOT MONEY TO BUY MORE.: NEVER TRUE

## 2025-07-13 SDOH — ECONOMIC STABILITY: INCOME INSECURITY: IN THE LAST 12 MONTHS, WAS THERE A TIME WHEN YOU WERE NOT ABLE TO PAY THE MORTGAGE OR RENT ON TIME?: NO

## 2025-07-13 SDOH — ECONOMIC STABILITY: FOOD INSECURITY: WITHIN THE PAST 12 MONTHS, THE FOOD YOU BOUGHT JUST DIDN'T LAST AND YOU DIDN'T HAVE MONEY TO GET MORE.: NEVER TRUE

## 2025-07-16 ENCOUNTER — OFFICE VISIT (OUTPATIENT)
Dept: OBGYN CLINIC | Age: 55
End: 2025-07-16
Payer: COMMERCIAL

## 2025-07-16 VITALS
WEIGHT: 167 LBS | BODY MASS INDEX: 27.82 KG/M2 | DIASTOLIC BLOOD PRESSURE: 80 MMHG | SYSTOLIC BLOOD PRESSURE: 124 MMHG | HEIGHT: 65 IN

## 2025-07-16 DIAGNOSIS — Z12.4 ENCOUNTER FOR PAPANICOLAOU SMEAR FOR CERVICAL CANCER SCREENING: ICD-10-CM

## 2025-07-16 DIAGNOSIS — Z85.43 HISTORY OF OVARIAN CANCER: ICD-10-CM

## 2025-07-16 DIAGNOSIS — Z01.419 WELL WOMAN EXAM: Primary | ICD-10-CM

## 2025-07-16 PROCEDURE — 99396 PREV VISIT EST AGE 40-64: CPT | Performed by: OBSTETRICS & GYNECOLOGY

## 2025-07-16 PROCEDURE — 99459 PELVIC EXAMINATION: CPT | Performed by: OBSTETRICS & GYNECOLOGY

## 2025-07-18 LAB
COLLECTION METHOD: NORMAL
CYTOLOGIST CVX/VAG CYTO: NORMAL
CYTOLOGY CVX/VAG DOC THIN PREP: NORMAL
HPV REFLEX: NORMAL
Lab: NORMAL
PAP SOURCE: NORMAL
PATH REPORT.FINAL DX SPEC: NORMAL
STAT OF ADQ CVX/VAG CYTO-IMP: NORMAL

## 2025-08-05 ENCOUNTER — PATIENT MESSAGE (OUTPATIENT)
Dept: INTERNAL MEDICINE CLINIC | Facility: CLINIC | Age: 55
End: 2025-08-05

## 2025-08-08 ENCOUNTER — LAB (OUTPATIENT)
Dept: INTERNAL MEDICINE CLINIC | Facility: CLINIC | Age: 55
End: 2025-08-08

## 2025-08-08 ENCOUNTER — HOSPITAL ENCOUNTER (OUTPATIENT)
Dept: MAMMOGRAPHY | Age: 55
Discharge: HOME | End: 2025-08-11
Payer: COMMERCIAL

## 2025-08-08 DIAGNOSIS — E55.9 VITAMIN D DEFICIENCY: ICD-10-CM

## 2025-08-08 DIAGNOSIS — R73.03 PREDIABETES: ICD-10-CM

## 2025-08-08 DIAGNOSIS — Z12.39 ENCOUNTER FOR BREAST CANCER SCREENING OTHER THAN MAMMOGRAM: ICD-10-CM

## 2025-08-08 DIAGNOSIS — Z01.419 WELL WOMAN EXAM: ICD-10-CM

## 2025-08-08 DIAGNOSIS — E78.2 MIXED HYPERLIPIDEMIA: ICD-10-CM

## 2025-08-08 LAB
25(OH)D3 SERPL-MCNC: 30.7 NG/ML (ref 30–100)
ALBUMIN SERPL-MCNC: 3.8 G/DL (ref 3.5–5)
ALBUMIN/GLOB SERPL: 1.1 (ref 1–1.9)
ALP SERPL-CCNC: 93 U/L (ref 35–104)
ALT SERPL-CCNC: 21 U/L (ref 8–45)
ANION GAP SERPL CALC-SCNC: 10 MMOL/L (ref 7–16)
APPEARANCE UR: CLEAR
AST SERPL-CCNC: 20 U/L (ref 15–37)
BASOPHILS # BLD: 0.06 K/UL (ref 0–0.2)
BASOPHILS NFR BLD: 1.1 % (ref 0–2)
BILIRUB SERPL-MCNC: 0.4 MG/DL (ref 0–1.2)
BILIRUB UR QL: NEGATIVE
BUN SERPL-MCNC: 14 MG/DL (ref 6–23)
CALCIUM SERPL-MCNC: 9.8 MG/DL (ref 8.8–10.2)
CHLORIDE SERPL-SCNC: 103 MMOL/L (ref 98–107)
CHOLEST SERPL-MCNC: 224 MG/DL (ref 0–200)
CO2 SERPL-SCNC: 28 MMOL/L (ref 20–29)
COLOR UR: NORMAL
CREAT SERPL-MCNC: 0.64 MG/DL (ref 0.6–1.1)
DIFFERENTIAL METHOD BLD: NORMAL
EOSINOPHIL # BLD: 0.12 K/UL (ref 0–0.8)
EOSINOPHIL NFR BLD: 2.1 % (ref 0.5–7.8)
ERYTHROCYTE [DISTWIDTH] IN BLOOD BY AUTOMATED COUNT: 12.5 % (ref 11.9–14.6)
EST. AVERAGE GLUCOSE BLD GHB EST-MCNC: 130 MG/DL
GLOBULIN SER CALC-MCNC: 3.3 G/DL (ref 2.3–3.5)
GLUCOSE SERPL-MCNC: 100 MG/DL (ref 70–99)
GLUCOSE UR STRIP.AUTO-MCNC: NEGATIVE MG/DL
HBA1C MFR BLD: 6.1 % (ref 0–5.6)
HCT VFR BLD AUTO: 39.9 % (ref 35.8–46.3)
HDLC SERPL-MCNC: 70 MG/DL (ref 40–60)
HDLC SERPL: 3.2 (ref 0–5)
HGB BLD-MCNC: 13.6 G/DL (ref 11.7–15.4)
HGB UR QL STRIP: NEGATIVE
IMM GRANULOCYTES # BLD AUTO: 0.01 K/UL (ref 0–0.5)
IMM GRANULOCYTES NFR BLD AUTO: 0.2 % (ref 0–5)
KETONES UR QL STRIP.AUTO: NEGATIVE MG/DL
LDLC SERPL CALC-MCNC: 124 MG/DL (ref 0–100)
LEUKOCYTE ESTERASE UR QL STRIP.AUTO: NEGATIVE
LYMPHOCYTES # BLD: 2.22 K/UL (ref 0.5–4.6)
LYMPHOCYTES NFR BLD: 38.9 % (ref 13–44)
MCH RBC QN AUTO: 28.8 PG (ref 26.1–32.9)
MCHC RBC AUTO-ENTMCNC: 34.1 G/DL (ref 31.4–35)
MCV RBC AUTO: 84.5 FL (ref 82–102)
MONOCYTES # BLD: 0.46 K/UL (ref 0.1–1.3)
MONOCYTES NFR BLD: 8.1 % (ref 4–12)
NEUTS SEG # BLD: 2.83 K/UL (ref 1.7–8.2)
NEUTS SEG NFR BLD: 49.6 % (ref 43–78)
NITRITE UR QL STRIP.AUTO: NEGATIVE
NRBC # BLD: 0 K/UL (ref 0–0.2)
PH UR STRIP: 6.5 (ref 5–9)
PLATELET # BLD AUTO: 337 K/UL (ref 150–450)
PMV BLD AUTO: 9.9 FL (ref 9.4–12.3)
POTASSIUM SERPL-SCNC: 4.4 MMOL/L (ref 3.5–5.1)
PROT SERPL-MCNC: 7.1 G/DL (ref 6.3–8.2)
PROT UR STRIP-MCNC: NEGATIVE MG/DL
RBC # BLD AUTO: 4.72 M/UL (ref 4.05–5.2)
SODIUM SERPL-SCNC: 140 MMOL/L (ref 136–145)
SP GR UR REFRACTOMETRY: 1.02 (ref 1–1.02)
T4 FREE SERPL-MCNC: 1.1 NG/DL (ref 0.9–1.7)
TRIGL SERPL-MCNC: 148 MG/DL (ref 0–150)
TSH, 3RD GENERATION: 1.39 UIU/ML (ref 0.27–4.2)
UROBILINOGEN UR QL STRIP.AUTO: 0.2 EU/DL (ref 0.2–1)
VLDLC SERPL CALC-MCNC: 30 MG/DL (ref 6–23)
WBC # BLD AUTO: 5.7 K/UL (ref 4.3–11.1)

## 2025-08-08 PROCEDURE — 77067 SCR MAMMO BI INCL CAD: CPT

## 2025-08-14 ASSESSMENT — PATIENT HEALTH QUESTIONNAIRE - PHQ9
2. FEELING DOWN, DEPRESSED OR HOPELESS: NOT AT ALL
2. FEELING DOWN, DEPRESSED OR HOPELESS: NOT AT ALL
1. LITTLE INTEREST OR PLEASURE IN DOING THINGS: NOT AT ALL
1. LITTLE INTEREST OR PLEASURE IN DOING THINGS: NOT AT ALL
SUM OF ALL RESPONSES TO PHQ QUESTIONS 1-9: 0
SUM OF ALL RESPONSES TO PHQ9 QUESTIONS 1 & 2: 0
SUM OF ALL RESPONSES TO PHQ QUESTIONS 1-9: 0

## 2025-08-21 ENCOUNTER — OFFICE VISIT (OUTPATIENT)
Dept: INTERNAL MEDICINE CLINIC | Facility: CLINIC | Age: 55
End: 2025-08-21
Payer: COMMERCIAL

## 2025-08-21 ENCOUNTER — LAB (OUTPATIENT)
Dept: INTERNAL MEDICINE CLINIC | Facility: CLINIC | Age: 55
End: 2025-08-21

## 2025-08-21 VITALS
OXYGEN SATURATION: 97 % | WEIGHT: 168.5 LBS | BODY MASS INDEX: 28.07 KG/M2 | DIASTOLIC BLOOD PRESSURE: 68 MMHG | HEIGHT: 65 IN | TEMPERATURE: 97.2 F | SYSTOLIC BLOOD PRESSURE: 100 MMHG | HEART RATE: 74 BPM

## 2025-08-21 DIAGNOSIS — E78.2 MIXED HYPERLIPIDEMIA: ICD-10-CM

## 2025-08-21 DIAGNOSIS — E55.9 VITAMIN D DEFICIENCY: ICD-10-CM

## 2025-08-21 DIAGNOSIS — F41.9 ANXIETY: ICD-10-CM

## 2025-08-21 DIAGNOSIS — J30.1 SEASONAL ALLERGIC RHINITIS DUE TO POLLEN: ICD-10-CM

## 2025-08-21 DIAGNOSIS — G56.03 BILATERAL CARPAL TUNNEL SYNDROME: ICD-10-CM

## 2025-08-21 DIAGNOSIS — R73.03 PREDIABETES: ICD-10-CM

## 2025-08-21 DIAGNOSIS — Z00.00 ENCOUNTER FOR ANNUAL HEALTH EXAMINATION: Primary | ICD-10-CM

## 2025-08-21 DIAGNOSIS — Z85.43 HISTORY OF OVARIAN CANCER: ICD-10-CM

## 2025-08-21 PROCEDURE — 99396 PREV VISIT EST AGE 40-64: CPT | Performed by: INTERNAL MEDICINE

## 2025-08-21 RX ORDER — ERGOCALCIFEROL 1.25 MG/1
50000 CAPSULE, LIQUID FILLED ORAL WEEKLY
Qty: 12 CAPSULE | Refills: 3 | Status: SHIPPED | OUTPATIENT
Start: 2025-08-21

## 2025-08-21 RX ORDER — GABAPENTIN 100 MG/1
100 CAPSULE ORAL NIGHTLY
Qty: 90 CAPSULE | Refills: 0 | Status: SHIPPED | OUTPATIENT
Start: 2025-08-21 | End: 2025-11-19

## 2025-08-21 ASSESSMENT — ENCOUNTER SYMPTOMS
RESPIRATORY NEGATIVE: 1
ABDOMINAL PAIN: 0
CHEST TIGHTNESS: 0
FACIAL SWELLING: 0
BLOOD IN STOOL: 0
ALLERGIC/IMMUNOLOGIC NEGATIVE: 1
TROUBLE SWALLOWING: 0
SINUS PAIN: 0
VOICE CHANGE: 0
SORE THROAT: 0
RHINORRHEA: 0
EYES NEGATIVE: 1
VOMITING: 0
GASTROINTESTINAL NEGATIVE: 1
SHORTNESS OF BREATH: 0
SINUS PRESSURE: 0
COUGH: 0
NAUSEA: 0
DIARRHEA: 0

## 2025-08-21 ASSESSMENT — LIFESTYLE VARIABLES
HOW OFTEN DO YOU HAVE A DRINK CONTAINING ALCOHOL: NEVER
HOW MANY STANDARD DRINKS CONTAINING ALCOHOL DO YOU HAVE ON A TYPICAL DAY: PATIENT DOES NOT DRINK

## 2025-08-22 LAB — CANCER AG125 SERPL-ACNC: 11 U/ML (ref 0–38)

## (undated) DEVICE — SURGICAL PROCEDURE PACK BASIC ST FRANCIS

## (undated) DEVICE — SOLUTION IRRIG 3000ML 0.9% SOD CHL FLX CONT 0797208] ICU MEDICAL INC]

## (undated) DEVICE — KENDALL RADIOLUCENT FOAM MONITORING ELECTRODE RECTANGULAR SHAPE: Brand: KENDALL

## (undated) DEVICE — [RESECTOR CUTTER, ARTHROSCOPIC SHAVER BLADE,  DO NOT RESTERILIZE,  DO NOT USE IF PACKAGE IS DAMAGED,  KEEP DRY,  KEEP AWAY FROM SUNLIGHT]: Brand: FORMULA

## (undated) DEVICE — 3M™ STERI-DRAPE™ INCISE DRAPE 1050 (60CM X 45CM): Brand: STERI-DRAPE™

## (undated) DEVICE — ABDOMINAL PAD: Brand: DERMACEA

## (undated) DEVICE — PACK,SHOULDER,DRAPE,POUCH: Brand: MEDLINE

## (undated) DEVICE — (D)PREP SKN CHLRAPRP APPL 26ML -- CONVERT TO ITEM 371833

## (undated) DEVICE — GOWN,REINFORCED,POLY,AURORA,XXLARGE,STR: Brand: MEDLINE

## (undated) DEVICE — CARDINAL HEALTH FLEXIBLE LIGHT HANDLE COVER: Brand: CARDINAL HEALTH

## (undated) DEVICE — SUTURE ETHBND 2 L30IN NONABSORBABLE GRN WHT LT GRN MO-7 D8793

## (undated) DEVICE — SUTURE VCRL SZ 0 L27IN ABSRB UD L36MM CP-1 1/2 CIR REV CUT J267H

## (undated) DEVICE — PUDDLEVAC FLOOR SUCTION DEVICE: Brand: PUDDLEVAC

## (undated) DEVICE — MEDI-VAC NON-CONDUCTIVE SUCTION TUBING: Brand: CARDINAL HEALTH

## (undated) DEVICE — SYR 3ML LL TIP 1/10ML GRAD --

## (undated) DEVICE — SYR 5ML 1/5 GRAD LL NSAF LF --

## (undated) DEVICE — INFLOW CASSETTE TUBING, DO NOT USE IF PACKAGE IS DAMAGED: Brand: CROSSFLOW

## (undated) DEVICE — SLING ARM SWTH UNIV FOAM 1 SZ FITS MOST

## (undated) DEVICE — BUR SHV CUT HLLW 6 FLUT 5.5MM --

## (undated) DEVICE — OUTFLOW CASSETTE TUBING, DO NOT USE IF PACKAGE IS DAMAGED: Brand: CROSSFLOW

## (undated) DEVICE — SUTURE PROL SZ 2-0 L18IN NONABSORBABLE BLU FS L26MM 3/8 CIR 8685H

## (undated) DEVICE — CANNULA NSL ORAL AD FOR CAPNOFLEX CO2 O2 AIRLFE

## (undated) DEVICE — BLADE SHV CUT MENIS AGG + 4MM --

## (undated) DEVICE — 90-S, SUCTION PROBE, NON-BENDABLE, MAX CUT LEVEL 11: Brand: SERFAS ENERGY

## (undated) DEVICE — NDL PRT INJ NSAF BLNT 18GX1.5 --

## (undated) DEVICE — SUTURE ETHLN SZ 2-0 L18IN NONABSORBABLE BLK L26MM PS 3/8 585H

## (undated) DEVICE — DRAPE,U/SHT,SPLIT,FILM,60X84,STERILE: Brand: MEDLINE

## (undated) DEVICE — CONNECTOR TBNG OD5-7MM O2 END DISP

## (undated) DEVICE — NDL SPNE QNCKE 18GX3.5IN LF --

## (undated) DEVICE — (D)STRIP SKN CLSR 0.5X4IN WHT --